# Patient Record
Sex: FEMALE | Race: BLACK OR AFRICAN AMERICAN | Employment: OTHER
[De-identification: names, ages, dates, MRNs, and addresses within clinical notes are randomized per-mention and may not be internally consistent; named-entity substitution may affect disease eponyms.]

---

## 2021-11-04 ENCOUNTER — NURSE TRIAGE (OUTPATIENT)
Dept: OTHER | Facility: CLINIC | Age: 71
End: 2021-11-04

## 2021-11-04 ENCOUNTER — OFFICE VISIT (OUTPATIENT)
Dept: FAMILY MEDICINE CLINIC | Age: 71
End: 2021-11-04
Payer: MEDICARE

## 2021-11-04 VITALS
OXYGEN SATURATION: 98 % | TEMPERATURE: 97.8 F | WEIGHT: 156.2 LBS | HEART RATE: 94 BPM | DIASTOLIC BLOOD PRESSURE: 84 MMHG | SYSTOLIC BLOOD PRESSURE: 140 MMHG | BODY MASS INDEX: 29.49 KG/M2 | HEIGHT: 61 IN

## 2021-11-04 DIAGNOSIS — R41.3 SHORT-TERM MEMORY LOSS: ICD-10-CM

## 2021-11-04 DIAGNOSIS — I10 ESSENTIAL HYPERTENSION: ICD-10-CM

## 2021-11-04 DIAGNOSIS — Z13.1 DIABETES MELLITUS SCREENING: ICD-10-CM

## 2021-11-04 DIAGNOSIS — R35.0 INCREASED URINARY FREQUENCY: ICD-10-CM

## 2021-11-04 DIAGNOSIS — Z76.89 ENCOUNTER TO ESTABLISH CARE WITH NEW DOCTOR: Primary | ICD-10-CM

## 2021-11-04 LAB
BASOPHILS ABSOLUTE: 0.1 K/UL (ref 0–0.2)
BASOPHILS RELATIVE PERCENT: 0.9 %
EOSINOPHILS ABSOLUTE: 0.1 K/UL (ref 0–0.6)
EOSINOPHILS RELATIVE PERCENT: 1 %
HCT VFR BLD CALC: 47.7 % (ref 36–48)
HEMOGLOBIN: 15.6 G/DL (ref 12–16)
LYMPHOCYTES ABSOLUTE: 1.5 K/UL (ref 1–5.1)
LYMPHOCYTES RELATIVE PERCENT: 27 %
MCH RBC QN AUTO: 30.3 PG (ref 26–34)
MCHC RBC AUTO-ENTMCNC: 32.8 G/DL (ref 31–36)
MCV RBC AUTO: 92.4 FL (ref 80–100)
MONOCYTES ABSOLUTE: 0.4 K/UL (ref 0–1.3)
MONOCYTES RELATIVE PERCENT: 7.1 %
NEUTROPHILS ABSOLUTE: 3.6 K/UL (ref 1.7–7.7)
NEUTROPHILS RELATIVE PERCENT: 64 %
PDW BLD-RTO: 14.1 % (ref 12.4–15.4)
PLATELET # BLD: 281 K/UL (ref 135–450)
PMV BLD AUTO: 7.6 FL (ref 5–10.5)
RBC # BLD: 5.16 M/UL (ref 4–5.2)
WBC # BLD: 5.6 K/UL (ref 4–11)

## 2021-11-04 PROCEDURE — 1036F TOBACCO NON-USER: CPT | Performed by: FAMILY MEDICINE

## 2021-11-04 PROCEDURE — G8417 CALC BMI ABV UP PARAM F/U: HCPCS | Performed by: FAMILY MEDICINE

## 2021-11-04 PROCEDURE — G8400 PT W/DXA NO RESULTS DOC: HCPCS | Performed by: FAMILY MEDICINE

## 2021-11-04 PROCEDURE — 1090F PRES/ABSN URINE INCON ASSESS: CPT | Performed by: FAMILY MEDICINE

## 2021-11-04 PROCEDURE — 3017F COLORECTAL CA SCREEN DOC REV: CPT | Performed by: FAMILY MEDICINE

## 2021-11-04 PROCEDURE — 99203 OFFICE O/P NEW LOW 30 MIN: CPT | Performed by: FAMILY MEDICINE

## 2021-11-04 PROCEDURE — G8484 FLU IMMUNIZE NO ADMIN: HCPCS | Performed by: FAMILY MEDICINE

## 2021-11-04 PROCEDURE — 4040F PNEUMOC VAC/ADMIN/RCVD: CPT | Performed by: FAMILY MEDICINE

## 2021-11-04 PROCEDURE — G8427 DOCREV CUR MEDS BY ELIG CLIN: HCPCS | Performed by: FAMILY MEDICINE

## 2021-11-04 PROCEDURE — 1123F ACP DISCUSS/DSCN MKR DOCD: CPT | Performed by: FAMILY MEDICINE

## 2021-11-04 RX ORDER — AMLODIPINE BESYLATE 5 MG/1
5 TABLET ORAL EVERY MORNING
Qty: 30 TABLET | Refills: 1 | Status: SHIPPED | OUTPATIENT
Start: 2021-11-04

## 2021-11-04 RX ORDER — DONEPEZIL HYDROCHLORIDE 5 MG/1
5 TABLET, FILM COATED ORAL NIGHTLY
Qty: 90 TABLET | Refills: 1 | Status: SHIPPED | OUTPATIENT
Start: 2021-11-04

## 2021-11-04 ASSESSMENT — ENCOUNTER SYMPTOMS
ABDOMINAL PAIN: 0
TROUBLE SWALLOWING: 0
WHEEZING: 0
ABDOMINAL DISTENTION: 0
CONSTIPATION: 0
CHEST TIGHTNESS: 0
VOMITING: 0
SHORTNESS OF BREATH: 0
SINUS PRESSURE: 0
COUGH: 0
RHINORRHEA: 0
BACK PAIN: 0
DIARRHEA: 0
NAUSEA: 0
BLOOD IN STOOL: 0

## 2021-11-04 ASSESSMENT — PATIENT HEALTH QUESTIONNAIRE - PHQ9
SUM OF ALL RESPONSES TO PHQ9 QUESTIONS 1 & 2: 0
SUM OF ALL RESPONSES TO PHQ QUESTIONS 1-9: 0
SUM OF ALL RESPONSES TO PHQ QUESTIONS 1-9: 0
2. FEELING DOWN, DEPRESSED OR HOPELESS: 0
SUM OF ALL RESPONSES TO PHQ QUESTIONS 1-9: 0
1. LITTLE INTEREST OR PLEASURE IN DOING THINGS: 0

## 2021-11-04 NOTE — PROGRESS NOTES
Debby Jarvis   65 y.o. female   1950    This is patient's first visit with me. Debby Jarvis is here to establish care as their new PCP. Debby Jarvis has a PMH significant for:    Patient Active Problem List   Diagnosis    Hypertension    Hyperlipidemia    Anxiety       Reason(s) for visit:   Chief Complaint   Patient presents with   Tate Membreno New Doctor    Hypertension     187/112 on Monday. HPI:    Chart review:  -Patient called into nurse triage line today made a same-day appointment. She reported that she has been having issues with maintaining her blood pressure. She reported of a blood pressure reading of 178/112 with heart rate 90. Office visit encounter:    Patient was accompanied by his sister. Patient herself doesn't drive anymore. HTN:  -Hasn't seen a PCP in 6 years.  -No hx of MI, TIA/CVA. -No hx of stress test, LHC.  -Food: [employer] food - pizza. Frozen food.  -Patient has not been checking BP readings regularly  -Patient denied issues with frequent headache, chest pain, shortness of breath, palpitations, malaise, etc.  -Last eye exam: long time ago    Dementia:  -Per sister - 2-3 years of repeating things.  -No known [address] of dementia  -No hx of TIA/CVA  -No hx of head trauma  -Patient kept repeating the same questions within < 5 minutes apart - asking my name, asking if I had written my name down (which I had done as well as gave her my calling card), kept repeatedly asking/telling her sister that their father may have had dementia as well (sister denied this). Increased urinary frequency:  -Stated that she has noted she has been urinating more frequently than usual. She denied issues with dysuria, hematuria, vaginal bleeding/discharge, pelvic pain, fever, etc.  -No prior history of diabetes. As noted above, she has not had routine physical lab work done in a very long time.  -No report of issues of urinary continence.       PDMP monitoring:  -Revealed no controlled medications written of any kind.    -Last report:   Last PDMP [facility] as Reviewed [facility]):  Review User Review Instant Review Result   KRISTY MOORE 2021  1:25 PM Reviewed PDMP [1]       Allergies   Allergen Reactions    Lexapro [Escitalopram Oxalate] Dermatitis       No current outpatient medications on file prior to visit. No current facility-administered medications on file prior to visit. Family History   Problem Relation Age of Onset    Heart Failure Father     Hypertension Sister     Hypertension Brother     Liver Cancer Brother        Social History     Tobacco Use    Smoking status: Former Smoker     Packs/day: 1.00     Years: 15.00     Pack years: 15.00     Types: Cigarettes     Quit date: 2005     Years since quittin.8    Smokeless tobacco: Never Used   Substance Use Topics    Alcohol use: No        No results found for: WBC, HGB, HCT, MCV, PLT    Chemistry        Component Value Date/Time     2012 1035    K 4.1 2012 1035     2012 1035    CO2 27 2012 1035    BUN 16 2012 1035    CREATININE 1.1 2012 1035        Component Value Date/Time    CALCIUM 10.5 2012 1035    ALKPHOS 61 2012 1035    AST 22 2012 1035    ALT 14 2012 1035    BILITOT 0.80 2012 1035          Lab Results   Component Value Date    ALT 14 2012    AST 22 2012    ALKPHOS 61 2012    BILITOT 0.80 2012     No results found for: LABA1C  No results found for: EAG    Review of Systems   Constitutional: Negative for activity change, appetite change, fatigue, fever and unexpected weight change. HENT: Negative for congestion, rhinorrhea, sinus pressure and trouble swallowing. Respiratory: Negative for cough, chest tightness, shortness of breath and wheezing. Cardiovascular: Negative for chest pain, palpitations and leg swelling.    Gastrointestinal: Negative for abdominal distention, abdominal pain, blood in stool, constipation, diarrhea, nausea and vomiting. Genitourinary: Positive for frequency. Negative for dysuria and hematuria. Musculoskeletal: Negative for arthralgias and back pain. Skin: Negative for rash. Neurological: Negative for dizziness, weakness, light-headedness, numbness and headaches. Psychiatric/Behavioral: Negative for sleep disturbance. Wt Readings from Last 3 Encounters:   11/04/21 156 lb 3.2 oz (70.9 kg)   05/08/15 157 lb (71.2 kg)   05/09/14 161 lb (73 kg)       BP Readings from Last 3 Encounters:   11/04/21 (!) 140/84   05/08/15 112/60   05/09/14 118/66       Pulse Readings from Last 3 Encounters:   11/04/21 94   05/08/15 80   05/09/14 76       BP (!) 140/84   Pulse 94   Temp 97.8 °F (36.6 °C)   Ht 5' 1\" (1.549 m)   Wt 156 lb 3.2 oz (70.9 kg)   SpO2 98%   BMI 29.51 kg/m²      Physical Exam  Vitals reviewed. Constitutional:       General: She is awake. She is not in acute distress. Appearance: She is overweight. She is not ill-appearing or diaphoretic. HENT:      Head: Normocephalic and atraumatic. No abrasion or masses. Hair is normal.      Right Ear: External ear normal.      Left Ear: External ear normal.      Nose: Nose normal.   Eyes:      General: Lids are normal. Gaze aligned appropriately. No scleral icterus. Right eye: No discharge. Left eye: No discharge. Extraocular Movements: Extraocular movements intact. Conjunctiva/sclera: Conjunctivae normal.   Neck:      Trachea: Phonation normal.   Cardiovascular:      Rate and Rhythm: Normal rate and regular rhythm. Pulmonary:      Effort: Pulmonary effort is normal. No respiratory distress. Breath sounds: No wheezing, rhonchi or rales. Abdominal:      General: Abdomen is flat. There is no distension. Palpations: Abdomen is soft. Musculoskeletal:         General: No deformity. Normal range of motion. Cervical back: Normal range of motion. No erythema.       Right lower leg: No edema. Left lower leg: No edema. Skin:     Coloration: Skin is not cyanotic, jaundiced or pale. Findings: No abrasion, abscess, bruising, ecchymosis, erythema, signs of injury, laceration, lesion, petechiae, rash or wound. Neurological:      General: No focal deficit present. Mental Status: She is alert. Mental status is at baseline. GCS: GCS eye subscore is 4. GCS verbal subscore is 5. GCS motor subscore is 6. Cranial Nerves: No cranial nerve deficit, dysarthria or facial asymmetry. Motor: No weakness, tremor, atrophy or seizure activity. Coordination: Coordination normal.      Gait: Gait is intact. Psychiatric:         Attention and Perception: Attention and perception normal.         Mood and Affect: Mood and affect normal.         Speech: Speech normal.         Behavior: Behavior normal. Behavior is cooperative. Thought Content: Thought content normal.       Assessment/Plan: Dheeraj Hagen was seen today for hypertension and psoriasis. Diagnoses and all orders for this visit:    Encounter to establish care with new doctor    Essential hypertension  Comments:  BP goal <130/80. Advised patient to take BP medication(s) as directed daily. It is recommended to monitor BP & HR on a weekly basis, maintain a low salt diet, monitor caffeine intake, and exercise as much as possible with recommended minimum goal of 150 min/week. Long history of uncontrolled HTN can lead to CAD/MI, TIA/stroke, aneurysm, retinopathy, etc.  Orders:  -     CBC Auto Differential; Future  -     Microalbumin / Creatinine Urine Ratio; Future  -     TSH without Reflex; Future  -     T4, Free; Future  -     Renal Function Panel; Future  -     Hepatic Function Panel; Future  -     Urinalysis; Future  -     amLODIPine (NORVASC) 5 MG tablet; Take 1 tablet by mouth every morning    Short-term memory loss  Comments:  Patient likely has Alzheimer's dementia and possibly mix vascular component.  Discussed the progressive nature of dementia and that there's no cure. We discussed that Donepezil will only help slow down the progression of dementia and will not significantly cause a major change in her memory. Orders:  -     donepezil (ARICEPT) 5 MG tablet; Take 1 tablet by mouth nightly    Increased urinary frequency  -     Microalbumin / Creatinine Urine Ratio; Future  -     Urinalysis; Future    Diabetes mellitus screening  -     Hemoglobin A1C; Future       I reviewed the plan of care with the patient. Patient acknowledged understanding and agreed with plan of care overall. Medications Discontinued During This Encounter   Medication Reason    triamterene-hydrochlorothiazide (MAXZIDE-25) 37.5-25 MG per tablet LIST CLEANUP    lisinopril (PRINIVIL;ZESTRIL) 10 MG tablet LIST CLEANUP    Cholecalciferol (VITAMIN D3) 1000 UNIT CAPS LIST CLEANUP    Ascorbic Acid (VITAMIN C) 250 MG tablet LIST CLEANUP        General information on medications:  -When it comes to medications, whether with starting or adding a new medication or increasing the dose of a current medication, the benefits and risks have to always be considered and weighed over, especially if one is taking other medications as well. -There are no medications that have no side effects and that there is always a risk involved with taking a medication.    -If a side effect were to occur with starting a new medication or with increasing the dose of a current medication that either the medication can be totally discontinued altogether or simply decrease the dose of it and if this would be the case a follow-up appointment would be deemed necessary.    -The drug allergy list will then be updated with the corresponding side effect(s) if it's deemed to be a true 'drug allergy'.     -The most common adverse effects of medication(s) were addressed at today's visit.    -Lastly, the coverage status of a medication may vary from insurance to insurance and the only way to verify if the medication is covered is to send an actual prescription in.    -The drug formulary of each insurance changes without any warning or notification to the healthcare provider let alone the pharmacy.  -The cost of medications vary from insurance to insurance and the cost is always subject to change just like the drug formulary. Follow-up: Return in about 4 weeks (around 12/2/2021) for HTN. .     Patient was informed that if his or her symptoms worsen to follow up with me sooner or go to the nearest ER if the symptoms are very significant and warrant higher level of care. Regarding my note:  -This note was composed (by me only and not with assistance via a scribe) to the best of my knowledge and recollection of the encounter with the patient using one of my own customized note templates utilizing a combination of typing and dictating with the [address] [place] [address] speech recognition software. As a result, the note may possibly have various errors (e.g. spelling, grammar, and non-sensible words/phrases/statements) despite reviewing the note prior to signing it for completion. Shawn Hector M.D.   [address] [place] [address]    Electronically signed by Pk Willis on 11/4/2021 at 5:11 PM.

## 2021-11-04 NOTE — PATIENT INSTRUCTIONS
Patient Education        DASH Diet: Care Instructions  Your Care Instructions     The DASH diet is an eating plan that can help lower your blood pressure. DASH stands for Dietary Approaches to Stop Hypertension. Hypertension is high blood pressure. The DASH diet focuses on eating foods that are high in calcium, potassium, and magnesium. These nutrients can lower blood pressure. The foods that are highest in these nutrients are fruits, vegetables, low-fat dairy products, nuts, seeds, and legumes. But taking calcium, potassium, and magnesium supplements instead of eating foods that are high in those nutrients does not have the same effect. The DASH diet also includes whole grains, fish, and poultry. The DASH diet is one of several lifestyle changes your doctor may recommend to lower your high blood pressure. Your doctor may also want you to decrease the amount of sodium in your diet. Lowering sodium while following the DASH diet can lower blood pressure even further than just the DASH diet alone. Follow-up care is a key part of your treatment and safety. Be sure to make and go to all appointments, and call your doctor if you are having problems. It's also a good idea to know your test results and keep a list of the medicines you take. How can you care for yourself at home? Following the DASH diet  · Eat 4 to 5 servings of fruit each day. A serving is 1 medium-sized piece of fruit, ½ cup chopped or canned fruit, 1/4 cup dried fruit, or 4 ounces (½ cup) of fruit juice. Choose fruit more often than fruit juice. · Eat 4 to 5 servings of vegetables each day. A serving is 1 cup of lettuce or raw leafy vegetables, ½ cup of chopped or cooked vegetables, or 4 ounces (½ cup) of vegetable juice. Choose vegetables more often than vegetable juice. · Get 2 to 3 servings of low-fat and fat-free dairy each day. A serving is 8 ounces of milk, 1 cup of yogurt, or 1 ½ ounces of cheese. · Eat 6 to 8 servings of grains each day.  A serving is 1 slice of bread, 1 ounce of dry cereal, or ½ cup of cooked rice, pasta, or cooked cereal. Try to choose whole-grain products as much as possible. · Limit lean meat, poultry, and fish to 2 servings each day. A serving is 3 ounces, about the size of a deck of cards. · Eat 4 to 5 servings of nuts, seeds, and legumes (cooked dried beans, lentils, and split peas) each week. A serving is 1/3 cup of nuts, 2 tablespoons of seeds, or ½ cup of cooked beans or peas. · Limit fats and oils to 2 to 3 servings each day. A serving is 1 teaspoon of vegetable oil or 2 tablespoons of salad dressing. · Limit sweets and added sugars to 5 servings or less a week. A serving is 1 tablespoon jelly or jam, ½ cup sorbet, or 1 cup of lemonade. · Eat less than 2,300 milligrams (mg) of sodium a day. If you limit your sodium to 1,500 mg a day, you can lower your blood pressure even more. · Be aware that all of these are the suggested number of servings for people who eat 1,800 to 2,000 calories a day. Your recommended number of servings may be different if you need more or fewer calories. Tips for success  · Start small. Do not try to make dramatic changes to your diet all at once. You might feel that you are missing out on your favorite foods and then be more likely to not follow the plan. Make small changes, and stick with them. Once those changes become habit, add a few more changes. · Try some of the following:  ? Make it a goal to eat a fruit or vegetable at every meal and at snacks. This will make it easy to get the recommended amount of fruits and vegetables each day. ? Try yogurt topped with fruit and nuts for a snack or healthy dessert. ? Add lettuce, tomato, cucumber, and onion to sandwiches. ? Combine a ready-made pizza crust with low-fat mozzarella cheese and lots of vegetable toppings. Try using tomatoes, squash, spinach, broccoli, carrots, cauliflower, and onions. ?  Have a variety of cut-up vegetables with a low-fat dip as an appetizer instead of chips and dip. ? Sprinkle sunflower seeds or chopped almonds over salads. Or try adding chopped walnuts or almonds to cooked vegetables. ? Try some vegetarian meals using beans and peas. Add garbanzo or kidney beans to salads. Make burritos and tacos with mashed tan beans or black beans. Where can you learn more? Go to https://IOCS.ShowUhow. org and sign in to your [employer] account. Enter K021 in the OneBuckResume box to learn more about \"DASH Diet: Care Instructions. \"     If you do not have an account, please click on the \"Sign Up Now\" link. Current as of: April 29, 2021               Content Version: 13.0  © 5442-2940 [employer], [employer]. Care instructions adapted under license by [facility] ([facility]). If you have questions about a medical condition or this instruction, always ask your healthcare professional. [address] any warranty or liability for your use of this information.

## 2021-11-04 NOTE — TELEPHONE ENCOUNTER
Reason for Disposition   [1] Caller is not with the adult (patient) AND [2] reporting urgent symptoms   Systolic BP >= 352 OR Diastolic >= 172    Answer Assessment - Initial Assessment Questions  1. REASON FOR CALL or QUESTION: \"What is your reason for calling today? \" or \"How can I best help you? \" or \"What question do you have that I can help answer? \"      She is having high blood pressue  per NP in home. 178/112,  HR 90. Had an appointment with Eduin Carrillo and appointment. Answer Assessment - Initial Assessment Questions  1. BLOOD PRESSURE: \"What is the blood pressure? \" \"Did you take at least two measurements 5 minutes apart?\"      178/112 on Monday,   By insurance NP doing yearly physical.    2. ONSET: \"When did you take your blood pressure? \"      Monday     3. HOW: \"How did you obtain the blood pressure? \" (e.g., visiting nurse, automatic home BP monitor)      Visiting nurse    4. HISTORY: \"Do you have a history of high blood pressure? \"      Yes    5. MEDICATIONS: Pradeep Bro you taking any medications for blood pressure? \" \"Have you missed any doses recently? \"      Not currently on any    6. OTHER SYMPTOMS: \"Do you have any symptoms? \" (e.g., headache, chest pain, blurred vision, difficulty breathing, weakness)      Denies chest pain, headache, diffficulty breathing, weakness, blurred or double vision. 7. PREGNANCY: \"Is there any chance you are pregnant? \" \"When was your last menstrual period? \"      No    Protocols used: INFORMATION ONLY CALL - NO TRIAGE-ADULT-AH, HIGH BLOOD PRESSURE-ADULT-[place]    Received call from Carl Cole at [facility] with Red Flag Complaint. Brief description of triage: no contact    Triage indicates for patient to go to ED    Care advice provided, patient verbalizes understanding; denies any other questions or concerns; instructed to call back for any new or worsening symptoms. Attention Provider: Thank you for allowing me to participate in the care of your patient.   The patient was connected to triage in response to information provided to the E[facility]. Please do not respond through this encounter as the response is not directed to a shared pool. Patient called E[facility] [place] with red flag complaint to establish care with Dr Srinivas Dillard. Brief description of triage: elevated blood pressure    Triage indicates for patient to be seen today  [facility] or ED if no appointments today    Care advice provided, patient verbalizes understanding; denies any other questions or concerns; instructed to call back for any new or worsening symptoms. Writer provided warm transfer to [facility] at [facility] for appointment scheduling. Attention Provider: Thank you for allowing me to participate in the care of your patient. The patient was connected to triage in response to information provided to the ECC. Please do not respond through this encounter as the response is not directed to a shared pool.

## 2021-11-05 LAB
ALBUMIN SERPL-MCNC: 4.4 G/DL (ref 3.4–5)
ALP BLD-CCNC: 87 U/L (ref 40–129)
ALT SERPL-CCNC: 8 U/L (ref 10–40)
ANION GAP SERPL CALCULATED.3IONS-SCNC: 14 MMOL/L (ref 3–16)
AST SERPL-CCNC: 19 U/L (ref 15–37)
BILIRUB SERPL-MCNC: 0.8 MG/DL (ref 0–1)
BILIRUBIN DIRECT: <0.2 MG/DL (ref 0–0.3)
BILIRUBIN, INDIRECT: ABNORMAL MG/DL (ref 0–1)
BUN BLDV-MCNC: 17 MG/DL (ref 7–20)
CALCIUM SERPL-MCNC: 9.4 MG/DL (ref 8.3–10.6)
CHLORIDE BLD-SCNC: 98 MMOL/L (ref 99–110)
CO2: 27 MMOL/L (ref 21–32)
CREAT SERPL-MCNC: 0.8 MG/DL (ref 0.6–1.2)
CREATININE URINE: 78.5 MG/DL (ref 28–259)
GFR AFRICAN AMERICAN: >60
GFR NON-AFRICAN AMERICAN: >60
GLUCOSE BLD-MCNC: 62 MG/DL (ref 70–99)
MICROALBUMIN UR-MCNC: 43.7 MG/DL
MICROALBUMIN/CREAT UR-RTO: 556.7 MG/G (ref 0–30)
PHOSPHORUS: 3.5 MG/DL (ref 2.5–4.9)
POTASSIUM SERPL-SCNC: 5.1 MMOL/L (ref 3.5–5.1)
SODIUM BLD-SCNC: 139 MMOL/L (ref 136–145)
T4 FREE: 1.6 NG/DL (ref 0.9–1.8)
TOTAL PROTEIN: 7.4 G/DL (ref 6.4–8.2)
TSH SERPL DL<=0.05 MIU/L-ACNC: 0.83 UIU/ML (ref 0.27–4.2)

## 2021-11-09 NOTE — PROGRESS NOTES
Carroll Urban   65 y.o. female   1950    HPI:    Patient was last seen by me on 11/4/2021. During our last office visit:   -This was her first visit to establish care as her new PCP. Reason(s) for visit:   Chief Complaint   Patient presents with   [address] Other     Would like a letter dismissing her from [email or web address] duty.  Other     Pt's sisters came on her behalf. They have a POA. Patient did not accompany her sister (PoA). She refused to come and leave her apartment according to her sister. The patient's other sister came as well, who I have not met before. The patient's power of  provided forms that prove that she indeed is the power of . This was then subsequently scanned into the patient's electronic medical record. We discussed the patient's lab work today. I ordered routine comprehensive lab work. CBC, RFT, LFT, TSH, free T4 were normal.  Glucose was slightly low at 62. Urine test showed signs of macroalbuminuria. We also discussed the request of a letter to excuse the power of  from being summoned to jury duty for Nov 22nd. Patient's power of  (sister) is her sole caretaker and handles everything for the patient - driving, acquiring basic necessities (e.g. food, toiletries), manages her finances, etc.  The PoA did not disclose any other reasons that I should be notified of (including health reasons) that would prevent her from being summoned to jury duty. Allergies   Allergen Reactions    Lexapro [Escitalopram Oxalate] Dermatitis       Current Outpatient Medications on File Prior to Visit   Medication Sig Dispense Refill    amLODIPine (NORVASC) 5 MG tablet Take 1 tablet by mouth every morning 30 tablet 1    donepezil (ARICEPT) 5 MG tablet Take 1 tablet by mouth nightly 90 tablet 1     No current facility-administered medications on file prior to visit.         Family History   Problem Relation Age of Onset    Heart Failure Father  Hypertension Sister     Hypertension Brother     Liver Cancer Brother        Social History     Tobacco Use    Smoking status: Former Smoker     Packs/day: 1.00     Years: 15.00     Pack years: 15.00     Types: Cigarettes     Quit date: 2005     Years since quittin.8    Smokeless tobacco: Never Used   Substance Use Topics    Alcohol use: No        Lab Results   Component Value Date    WBC 5.6 2021    HGB 15.6 2021    HCT 47.7 2021    MCV 92.4 2021     2021       Chemistry        Component Value Date/Time     2021 1614    K 5.1 2021 1614    CL 98 (L) 2021 1614    CO2 27 2021 1614    BUN 17 2021 1614    CREATININE 0.8 2021 1614        Component Value Date/Time    CALCIUM 9.4 2021 1614    ALKPHOS 87 2021 1614    AST 19 2021 1614    ALT 8 (L) 2021 1614    BILITOT 0.8 2021 1614          Lab Results   Component Value Date    ALT 8 (L) 2021    AST 19 2021    ALKPHOS 87 2021    BILITOT 0.8 2021     No results found for: LABA1C  No results found for: EAG    Review of Systems   Constitutional: Negative for activity change, appetite change, fatigue, fever and unexpected weight change. HENT: Negative for congestion, rhinorrhea, sinus pressure and trouble swallowing. Respiratory: Negative for cough, chest tightness, shortness of breath and wheezing. Cardiovascular: Negative for chest pain, palpitations and leg swelling. Gastrointestinal: Negative for abdominal distention, abdominal pain, blood in stool, constipation, diarrhea, nausea and vomiting. Genitourinary: Negative for dysuria, frequency and hematuria. Musculoskeletal: Negative for arthralgias and back pain. Skin: Negative for rash. Neurological: Negative for dizziness, weakness, light-headedness, numbness and headaches.        Wt Readings from Last 3 Encounters:   21 156 lb 3.2 oz (70.9 kg)   05/08/15 157 lb (71.2 kg)   05/09/14 161 lb (73 kg)       BP Readings from Last 3 Encounters:   11/04/21 (!) 140/84   05/08/15 112/60   05/09/14 118/66       Pulse Readings from Last 3 Encounters:   11/04/21 94   05/08/15 80   05/09/14 76       There were no vitals taken for this visit. Physical Exam  Vitals reviewed. Constitutional:       General: She is awake. She is not in acute distress. Appearance: She is overweight. She is not ill-appearing or diaphoretic. HENT:      Head: Normocephalic and atraumatic. No abrasion or masses. Hair is normal.      Right Ear: External ear normal.      Left Ear: External ear normal.      Nose: Nose normal.   Eyes:      General: Lids are normal. Gaze aligned appropriately. No scleral icterus. Right eye: No discharge. Left eye: No discharge. Extraocular Movements: Extraocular movements intact. Conjunctiva/sclera: Conjunctivae normal.   Neck:      Trachea: Phonation normal.   Cardiovascular:      Rate and Rhythm: Normal rate and regular rhythm. Pulmonary:      Effort: Pulmonary effort is normal. No respiratory distress. Breath sounds: No wheezing, rhonchi or rales. Abdominal:      General: Abdomen is flat. There is no distension. Palpations: Abdomen is soft. Musculoskeletal:         General: No deformity. Normal range of motion. Cervical back: Normal range of motion. No erythema. Right lower leg: No edema. Left lower leg: No edema. Skin:     Coloration: Skin is not cyanotic, jaundiced or pale. Findings: No abrasion, abscess, bruising, ecchymosis, erythema, signs of injury, laceration, lesion, petechiae, rash or wound. Neurological:      General: No focal deficit present. Mental Status: She is alert. Mental status is at baseline. GCS: GCS eye subscore is 4. GCS verbal subscore is 5. GCS motor subscore is 6. Cranial Nerves: No cranial nerve deficit, dysarthria or facial asymmetry.       Motor: No weakness, tremor, atrophy or seizure activity. Coordination: Coordination normal.      Gait: Gait is intact. Psychiatric:         Attention and Perception: Attention and perception normal.         Mood and Affect: Mood and affect normal.         Speech: Speech normal.         Behavior: Behavior normal. Behavior is cooperative. Thought Content: Thought content normal.       Assessment/Plan: Lori Sidhu was seen today for discuss labs, other and other. Diagnoses and all orders for this visit:    Encounter for completion of form with patient - jury duty  Comments:  Letter provided to power of . Essential hypertension  -     losartan (COZAAR) 25 MG tablet; Take 1 tablet by mouth nightly    Hypertensive kidney disease  -     losartan (COZAAR) 25 MG tablet; Take 1 tablet by mouth nightly    Positive for macroalbuminuria  Comments:  Discussed that ACE/ARB are first line treatment for HTN and proteinuria. Orders:  -     losartan (COZAAR) 25 MG tablet; Take 1 tablet by mouth nightly    Current non-adherence to medical treatment  Comments:  Emphasized need to be compliant with meds. I reviewed the plan of care with the patient. Patient acknowledged understanding and agreed with plan of care overall. There are no discontinued medications. General information on medications:  -When it comes to medications, whether with starting or adding a new medication or increasing the dose of a current medication, the benefits and risks have to always be considered and weighed over, especially if one is taking other medications as well.    -There are no medications that have no side effects and that there is always a risk involved with taking a medication.    -If a side effect were to occur with starting a new medication or with increasing the dose of a current medication that either the medication can be totally discontinued altogether or simply decrease the dose of it and if this would be the case a

## 2021-11-10 ENCOUNTER — OFFICE VISIT (OUTPATIENT)
Dept: FAMILY MEDICINE CLINIC | Age: 71
End: 2021-11-10
Payer: MEDICARE

## 2021-11-10 DIAGNOSIS — I12.9 HYPERTENSIVE KIDNEY DISEASE: ICD-10-CM

## 2021-11-10 DIAGNOSIS — Z02.89 ENCOUNTER FOR COMPLETION OF FORM WITH PATIENT: Primary | ICD-10-CM

## 2021-11-10 DIAGNOSIS — Z91.199 CURRENT NON-ADHERENCE TO MEDICAL TREATMENT: ICD-10-CM

## 2021-11-10 DIAGNOSIS — R80.9 POSITIVE FOR MACROALBUMINURIA: ICD-10-CM

## 2021-11-10 DIAGNOSIS — I10 ESSENTIAL HYPERTENSION: ICD-10-CM

## 2021-11-10 PROCEDURE — G8400 PT W/DXA NO RESULTS DOC: HCPCS | Performed by: FAMILY MEDICINE

## 2021-11-10 PROCEDURE — 4040F PNEUMOC VAC/ADMIN/RCVD: CPT | Performed by: FAMILY MEDICINE

## 2021-11-10 PROCEDURE — G8427 DOCREV CUR MEDS BY ELIG CLIN: HCPCS | Performed by: FAMILY MEDICINE

## 2021-11-10 PROCEDURE — 1036F TOBACCO NON-USER: CPT | Performed by: FAMILY MEDICINE

## 2021-11-10 PROCEDURE — 1090F PRES/ABSN URINE INCON ASSESS: CPT | Performed by: FAMILY MEDICINE

## 2021-11-10 PROCEDURE — 99213 OFFICE O/P EST LOW 20 MIN: CPT | Performed by: FAMILY MEDICINE

## 2021-11-10 PROCEDURE — 3017F COLORECTAL CA SCREEN DOC REV: CPT | Performed by: FAMILY MEDICINE

## 2021-11-10 PROCEDURE — 1123F ACP DISCUSS/DSCN MKR DOCD: CPT | Performed by: FAMILY MEDICINE

## 2021-11-10 PROCEDURE — G8484 FLU IMMUNIZE NO ADMIN: HCPCS | Performed by: FAMILY MEDICINE

## 2021-11-10 PROCEDURE — G8417 CALC BMI ABV UP PARAM F/U: HCPCS | Performed by: FAMILY MEDICINE

## 2021-11-10 RX ORDER — LOSARTAN POTASSIUM 25 MG/1
25 TABLET ORAL NIGHTLY
Qty: 90 TABLET | Refills: 0 | Status: SHIPPED | OUTPATIENT
Start: 2021-11-10

## 2021-11-10 ASSESSMENT — ENCOUNTER SYMPTOMS
SINUS PRESSURE: 0
WHEEZING: 0
ABDOMINAL DISTENTION: 0
CONSTIPATION: 0
TROUBLE SWALLOWING: 0
NAUSEA: 0
DIARRHEA: 0
BLOOD IN STOOL: 0
ABDOMINAL PAIN: 0
CHEST TIGHTNESS: 0
BACK PAIN: 0
SHORTNESS OF BREATH: 0
COUGH: 0
RHINORRHEA: 0
VOMITING: 0

## 2022-01-08 NOTE — PROGRESS NOTES
Eusebio Pritchett   66 y.o. female   1950    HPI:    Patient was last seen by me on 11/10/2021. During our last office visit:   -Letter excusing patient for jury duty was composed at the request of patient.  -Losartan 25 mg was prescribed for hypertension and recent urinalysis showing positive for macroalbuminuria (microalb:Cr  - 556.7). Reason(s) for visit:   Chief Complaint   Patient presents with    Other     Pt's sister came on pt's behalf. She is POA. She reported that things are stable but pt is not taking her medication. Patient was accompanied today by her sister, who is also her power of . PoA stated that she has good appetite and as a result, her cognition has improved. She's well hydrated. PoA and sisters provide her food. PoA and sister reported that she has not been taking her meds. She has no issues with her ADLs, but does not bathe every day. She does not like clutter and keep herself clean. She only uses a microwave for \"cooking. \"  She hasn't wandered away, but did some time early last year. She lives in a 2nd floor apartment. PoA noted that a [employer]  took her and went from various ATMs to get her money. She was able to remember her GINGER ID. PoA fortunately was notified by the bank. Power of  noted that after this incident that she contacted the bank and had a transfer to a secure savings account. Both sisters stated that they are retired and see her at least twice a week.     Dementia:  -Per sister - 2-3 years of repeating things.  -No known [address] of dementia  -No hx of TIA/CVA  -No hx of head trauma      Allergies   Allergen Reactions    Lexapro [Escitalopram Oxalate] Dermatitis       Current Outpatient Medications on File Prior to Visit   Medication Sig Dispense Refill    losartan (COZAAR) 25 MG tablet Take 1 tablet by mouth nightly (Patient not taking: Reported on 1/10/2022) 90 tablet 0    amLODIPine (NORVASC) 5 MG tablet Take 1 tablet by mouth every morning (Patient not taking: Reported on 1/10/2022) 30 tablet 1    donepezil (ARICEPT) 5 MG tablet Take 1 tablet by mouth nightly (Patient not taking: Reported on 1/10/2022) 90 tablet 1     No current facility-administered medications on file prior to visit. Family History   Problem Relation Age of Onset    Heart Failure Father     Hypertension Sister     Hypertension Brother     Liver Cancer Brother        Social History     Tobacco Use    Smoking status: Former Smoker     Packs/day: 1.00     Years: 15.00     Pack years: 15.00     Types: Cigarettes     Quit date: 2005     Years since quittin.0    Smokeless tobacco: Never Used   Substance Use Topics    Alcohol use: No        Lab Results   Component Value Date    WBC 5.6 2021    HGB 15.6 2021    HCT 47.7 2021    MCV 92.4 2021     2021       Chemistry        Component Value Date/Time     2021 1614    K 5.1 2021 1614    CL 98 (L) 2021 1614    CO2 27 2021 1614    BUN 17 2021 1614    CREATININE 0.8 2021 1614        Component Value Date/Time    CALCIUM 9.4 2021 1614    ALKPHOS 87 2021 1614    AST 19 2021 1614    ALT 8 (L) 2021 1614    BILITOT 0.8 2021 1614          Lab Results   Component Value Date    ALT 8 (L) 2021    AST 19 2021    ALKPHOS 87 2021    BILITOT 0.8 2021     No results found for: LABA1C  No results found for: EAG    Review of Systems   Unable to perform ROS: Other      Wt Readings from Last 3 Encounters:   21 156 lb 3.2 oz (70.9 kg)   05/08/15 157 lb (71.2 kg)   14 161 lb (73 kg)       BP Readings from Last 3 Encounters:   21 (!) 140/84   05/08/15 112/60   14 118/66       Pulse Readings from Last 3 Encounters:   21 94   05/08/15 80   14 76       There were no vitals taken for this visit. Physical Exam  Vitals reviewed.    Constitutional:       General: She is awake. She is not in acute distress. Appearance: She is overweight. She is not ill-appearing or diaphoretic. HENT:      Head: Normocephalic and atraumatic. No abrasion or masses. Hair is normal.      Right Ear: External ear normal.      Left Ear: External ear normal.      Nose: Nose normal.   Eyes:      General: Lids are normal. Gaze aligned appropriately. No scleral icterus. Right eye: No discharge. Left eye: No discharge. Extraocular Movements: Extraocular movements intact. Conjunctiva/sclera: Conjunctivae normal.   Neck:      Trachea: Phonation normal.   Cardiovascular:      Rate and Rhythm: Normal rate and regular rhythm. Pulmonary:      Effort: Pulmonary effort is normal. No respiratory distress. Breath sounds: No wheezing, rhonchi or rales. Abdominal:      General: Abdomen is flat. There is no distension. Palpations: Abdomen is soft. Musculoskeletal:         General: No deformity. Normal range of motion. Cervical back: Normal range of motion. No erythema. Right lower leg: No edema. Left lower leg: No edema. Skin:     Coloration: Skin is not cyanotic, jaundiced or pale. Findings: No abrasion, abscess, bruising, ecchymosis, erythema, signs of injury, laceration, lesion, petechiae, rash or wound. Neurological:      General: No focal deficit present. Mental Status: She is alert. Mental status is at baseline. GCS: GCS eye subscore is 4. GCS verbal subscore is 5. GCS motor subscore is 6. Cranial Nerves: No cranial nerve deficit, dysarthria or facial asymmetry. Motor: No weakness, tremor, atrophy or seizure activity. Coordination: Coordination normal.      Gait: Gait is intact. Psychiatric:         Attention and Perception: Attention and perception normal.         Mood and Affect: Mood and affect normal.         Speech: Speech normal.         Behavior: Behavior normal. Behavior is cooperative.          Thought Content: Thought content normal.       Assessment/Plan: Meagan Grant was seen today for other. Diagnoses and all orders for this visit:    Alzheimer's dementia without behavioral disturbance, unspecified timing of dementia onset [facility])  Comments:  Patient has good support network and there are no signs of neglect. Sisters monitor her safety, provide her with food, and manage her finances. I discussed with patient's two sisters (one of them being PoA) that telemedicine would be a viable option for the patient that can be conducted at her home preferably with a video conference call. I advised her sisters to speak to the patient about this option. I also did not note that a face-to-face encounter would be preferable, especially given that she has a history of hypertension. Patient's sisters would speak to patient about telemedicine. Essential hypertension  Comments:  Advised to restart Losartan for HTN and due to CKD issue. We discussed the long-term uncontrolled hypertension can lead to chronic kidney disease and possibly end-stage renal failure with treatment of dialysis being necessary. Positive for macroalbuminuria    Medical non-compliance  Comments:  Discussed about willing to follow up with patient for as long as telemedicine visit. We discussed one way about having her take her medications as simply crushing it up and mixing it with her food. Educated about 2019 novel coronavirus infection         Comments:                Advised to get COVID-19 vaccine. I reviewed the plan of care with the patient. Patient acknowledged understanding and agreed with plan of care overall. There are no discontinued medications. General information on medications:  -When it comes to medications, whether with starting or adding a new medication or increasing the dose of a current medication, the benefits and risks have to always be considered and weighed over, especially if one is taking other medications as well.

## 2022-01-10 ENCOUNTER — OFFICE VISIT (OUTPATIENT)
Dept: FAMILY MEDICINE CLINIC | Age: 72
End: 2022-01-10
Payer: MEDICARE

## 2022-01-10 DIAGNOSIS — Z71.89 EDUCATED ABOUT 2019 NOVEL CORONAVIRUS INFECTION: ICD-10-CM

## 2022-01-10 DIAGNOSIS — Z91.199 MEDICAL NON-COMPLIANCE: ICD-10-CM

## 2022-01-10 DIAGNOSIS — I10 ESSENTIAL HYPERTENSION: ICD-10-CM

## 2022-01-10 DIAGNOSIS — R80.9 POSITIVE FOR MACROALBUMINURIA: ICD-10-CM

## 2022-01-10 DIAGNOSIS — F02.80 ALZHEIMER'S DEMENTIA WITHOUT BEHAVIORAL DISTURBANCE, UNSPECIFIED TIMING OF DEMENTIA ONSET: Primary | ICD-10-CM

## 2022-01-10 DIAGNOSIS — G30.9 ALZHEIMER'S DEMENTIA WITHOUT BEHAVIORAL DISTURBANCE, UNSPECIFIED TIMING OF DEMENTIA ONSET: Primary | ICD-10-CM

## 2022-01-10 PROCEDURE — 1090F PRES/ABSN URINE INCON ASSESS: CPT | Performed by: FAMILY MEDICINE

## 2022-01-10 PROCEDURE — G8417 CALC BMI ABV UP PARAM F/U: HCPCS | Performed by: FAMILY MEDICINE

## 2022-01-10 PROCEDURE — 3017F COLORECTAL CA SCREEN DOC REV: CPT | Performed by: FAMILY MEDICINE

## 2022-01-10 PROCEDURE — G8484 FLU IMMUNIZE NO ADMIN: HCPCS | Performed by: FAMILY MEDICINE

## 2022-01-10 PROCEDURE — 1123F ACP DISCUSS/DSCN MKR DOCD: CPT | Performed by: FAMILY MEDICINE

## 2022-01-10 PROCEDURE — G8400 PT W/DXA NO RESULTS DOC: HCPCS | Performed by: FAMILY MEDICINE

## 2022-01-10 PROCEDURE — 1036F TOBACCO NON-USER: CPT | Performed by: FAMILY MEDICINE

## 2022-01-10 PROCEDURE — 4040F PNEUMOC VAC/ADMIN/RCVD: CPT | Performed by: FAMILY MEDICINE

## 2022-01-10 PROCEDURE — 99214 OFFICE O/P EST MOD 30 MIN: CPT | Performed by: FAMILY MEDICINE

## 2022-01-10 PROCEDURE — G8427 DOCREV CUR MEDS BY ELIG CLIN: HCPCS | Performed by: FAMILY MEDICINE

## 2022-04-11 NOTE — PROGRESS NOTES
Guanakito Yadav   66 y.o. female   1950    Virtual visit encounter type:   [] Doxy. me  [x] Telephone w/o video  [] Monohart  [] Other: This patient was last seen by me on 1/10/2022. HPI:  Chief Complaint   Patient presents with    3 Month Follow-Up    Hypertension    Dementia     Patient originally had an in person office visit scheduled for 4/11/2022. This had to be rescheduled because the actual patient did not show up while her sister did. Unfortunately, a video call cannot be arranged in the whole visit had to be done over the telephone. The patient herself as well as her 2 sisters were present during today's telephone encounter. I was able to speak the patient and she said that she had no particular health issues or concerns that she wanted discussed with me. I inquired about her living situation. She stated that she is able to perform basic ADLs such as putting her clothes on, feeding herself, bathing herself, etc.  She denied issues of urinary or fecal incontinence. Her sisters told me that she overall keeps her place nice and organized. There are no particular concerns from either the patient or the patient's sisters. Both sisters stated that her overall neurological and cognitive status have remained overall stable. Patient has a history of hypertension. She was prescribed both losartan and amlodipine, but she still continues to not take her medication. She does not check her blood pressure herself and has no one that she can rely on to check her blood pressure reliably. She also did not report trying to go to places such as the grocery store to try to check her blood pressure. Patient has history of dementia. She was prescribed donepezil last year by me, but has not been taking this medication as well. Of note, the patient's sister reported that the patient's diet has overall been better and she is eating healthier. Appetite is not an issue for her.     We discussed that as her PCP moving forward that she would need to participate in her office visits. We discussed about contacting [facility] to help with assisting with arranging home health    Dementia:  -Per sister - 2-3 years of repeating things.  -No known [facility] of dementia  -No hx of TIA/CVA  -No hx of head trauma      Allergies   Allergen Reactions    Lexapro [Escitalopram Oxalate] Dermatitis       Current Outpatient Medications on File Prior to Visit   Medication Sig Dispense Refill    losartan (COZAAR) 25 MG tablet Take 1 tablet by mouth nightly (Patient not taking: Reported on 1/10/2022) 90 tablet 0    amLODIPine (NORVASC) 5 MG tablet Take 1 tablet by mouth every morning (Patient not taking: Reported on 1/10/2022) 30 tablet 1    donepezil (ARICEPT) 5 MG tablet Take 1 tablet by mouth nightly (Patient not taking: Reported on 1/10/2022) 90 tablet 1     No current facility-administered medications on file prior to visit. Family History   Problem Relation Age of Onset    Heart Failure Father     Hypertension Sister     Hypertension Brother     Liver Cancer Brother        Social History     Tobacco Use    Smoking status: Former Smoker     Packs/day: 1.00     Years: 15.00     Pack years: 15.00     Types: Cigarettes     Quit date: 2005     Years since quittin.2    Smokeless tobacco: Never Used   Substance Use Topics    Alcohol use: No      Review of Systems   Constitutional: Negative for activity change, appetite change, fatigue, fever and unexpected weight change. HENT: Negative for congestion, rhinorrhea, sinus pressure and trouble swallowing. Respiratory: Negative for cough, chest tightness, shortness of breath and wheezing. Cardiovascular: Negative for chest pain, palpitations and leg swelling. Gastrointestinal: Negative for abdominal distention, abdominal pain, blood in stool, constipation, diarrhea, nausea and vomiting.    Genitourinary: Negative for dysuria, frequency and hematuria. Musculoskeletal: Negative for arthralgias and back pain. Skin: Negative for rash. Neurological: Negative for dizziness, weakness, light-headedness, numbness and headaches. Lab Results   Component Value Date    WBC 5.6 11/04/2021    HGB 15.6 11/04/2021    HCT 47.7 11/04/2021    MCV 92.4 11/04/2021     11/04/2021       Chemistry        Component Value Date/Time     11/04/2021 1614    K 5.1 11/04/2021 1614    CL 98 (L) 11/04/2021 1614    CO2 27 11/04/2021 1614    BUN 17 11/04/2021 1614    CREATININE 0.8 11/04/2021 1614        Component Value Date/Time    CALCIUM 9.4 11/04/2021 1614    ALKPHOS 87 11/04/2021 1614    AST 19 11/04/2021 1614    ALT 8 (L) 11/04/2021 1614    BILITOT 0.8 11/04/2021 1614          Lab Results   Component Value Date    ALT 8 (L) 11/04/2021    AST 19 11/04/2021    ALKPHOS 87 11/04/2021    BILITOT 0.8 11/04/2021       Wt Readings from Last 3 Encounters:   11/04/21 156 lb 3.2 oz (70.9 kg)   05/08/15 157 lb (71.2 kg)   05/09/14 161 lb (73 kg)     BP Readings from Last 3 Encounters:   11/04/21 (!) 140/84   05/08/15 112/60   05/09/14 118/66     Pulse Readings from Last 3 Encounters:   11/04/21 94   05/08/15 80   05/09/14 76       There were no vitals taken for this visit. Physical Exam   Unable to evaluate due to telephone w/o video encounter only    Assessment/Plan: Mehreen Lindquist was seen today for follow-up. Diagnoses and all orders for this visit:    Alzheimer's dementia without behavioral disturbance, unspecified timing of dementia onset ([facility] [address]  Comments:  I discussed with the patient and her sisters that dementia is progressive in nature and fortunately there is no cure for it as of this moment. I advised for her again to take her donepezil to help slow down the progression of her dementia and engage in any cognitive stimulating activities (e.g. reading). Essential hypertension  Comments:  I advised patient to take her BP medications as instructed.   I informed her that if her blood pressure remains uncontrolled that this could greatly increase the risk of developing a TIA/CVA and/or CAD/MI and therefore worsened her dementia. Hypertensive kidney disease  Comments:  Advised patient to take losartan and make sure that she drinks plenty of water to keep her self well-hydrated. Medical non-compliance      I reviewed the plan of care with the patient. Patient acknowledged understanding and agreed with plan of care overall. There are no discontinued medications. General information on medications:  -When it comes to medications, whether with starting or adding a new medication or increasing the dose of a current medication, the benefits and risks have to always be considered and weighed over, especially if one is taking other medications as well. -There are no medications that have no side effects and that there is always a risk involved with taking a medication.    -If a side effect were to occur with starting a new medication or with increasing the dose of a current medication that either the medication can be totally discontinued altogether or simply decrease the dose of it and if this would be the case a follow-up appointment would be deemed necessary.    -The drug allergy list will then be updated with the corresponding side effect(s) if it's deemed to be a true 'drug allergy'. -The most common adverse effects of medication(s) were addressed at today's visit.    -Lastly, the coverage status of a medication may vary from insurance to insurance and the only way to verify if the medication is covered is to send an actual prescription in.    -The drug formulary of each insurance changes without any warning or notification to the healthcare provider let alone the pharmacy.  -The cost of medications vary from insurance to insurance and the cost is always subject to change just like the drug formulary of each insurance.     Follow-up: Return in about 3 months (around 7/15/2022) for CDM - 3 mo. .     Patient was informed that if his or her symptoms worsen to follow up with me sooner or go to the nearest ER if the symptoms are very significant and warrant higher level of care. General disclaimer regarding telemedicine (virtual) visits:  Alexus Tan is a 66 y.o. female is being evaluated by a virtual visit (video visit) and/or telephone (without video) encounter to address their concern(s) as mentioned above. Prior to arranging this appointment, the patient was made aware of the need and importance to schedule the visit in this manner given the current ongoing COVID-19 pandemic. The patient was also made aware that the telemedicine service used (e.g.doxy. me) would not save let alone record any information (audio, video, and text) regarding the actual virtual visit. After this discussion, the patient acknowledged understanding and agreed to today's virtual visit encounter. A caregiver was present when appropriate as well as an  if requested. The patient is aware that telemedicine visits are a billable service just like an in person visit is. The patient was located in a state where the healthcare provider was licensed to provide care. Due to this being a TeleHealth encounter (during the OVCIV-21 public health emergency), evaluation of the following organ systems was overall limited: Vitals/Constitutional/EENT/Resp/CV/GI//MS/Neuro/Skin/Heme-Lymph-Imm. As a result, establishing a diagnosis(s) and formulating a plan of care may be overall more difficult and complicated compared to a conventional (face-to-face) office visit. The patient was made aware about the potential limitations and difficulties of a telemedicine visit such as having technical difficulties related to video and/or audio issues often stemming from a sub-optimal/poor Internet or cellular data connection and/or other factors.   If this is judged to be the case then the patient would be informed if deemed relevant and necessary that an in-person follow-up visit may be recommended. Pursuant to the emergency declaration under the [facility] [address] authority and the [employer] Act, this virtual visit was conducted with the patient's (and/or legal guardian's) consent, to reduce the patient's risk (as well as others) of exposure to COVID-19 and to continue to maintain and provide necessary medical care. The patient (and/or legal guardian) has also been advised to contact this office for worsening conditions or problems, and seek emergency medical treatment and/or call 911 if deemed necessary. Regarding my note:  -This note was composed (by me only and not with assistance via a scribe) to the best of my knowledge and recollection of the encounter with the patient using one of my own customized note templates utilizing a combination of typing and dictating with the [address] [place] [address] speech recognition software. As a result, the note may possibly contain various errors (e.g. spelling, grammar, and non-sensible words/phrases/statements) despite reviewing the note prior to signing it for completion. Time spent includes some or all of the following, both face-to-face time and non face-to-face time, but is not limited to:  [x] Preparing to see the patient by reviewing medical records available (notes, labs, imaging, etc.) prior to seeing the patient. [x] Obtaining and/or reviewing the history from the patient. [x] Performing a medically appropriate examination. [x] Ordering of relevant lab work, medications, referrals, or procedures. [x] Discussing patient's medical issues and formulating an assessment and plan. [x] Reviewing plan of care with patient. Answering any questions or concerns.    [x] Documentation within the electronic health record (EHR)  [] Reviewing records of history relevant to patient's issues after seeing the patient. [] Discussion or coordination of care with other health care professionals  [x] Other: length office visit: 22 minutes     Shawn Mas M.D.   [address] [place] [address]    Electronically signed by Dar Pate MD M.D. on 4/15/2022 at 5:53 PM.

## 2022-04-15 ENCOUNTER — TELEMEDICINE (OUTPATIENT)
Dept: FAMILY MEDICINE CLINIC | Age: 72
End: 2022-04-15
Payer: MEDICARE

## 2022-04-15 DIAGNOSIS — F02.80 ALZHEIMER'S DEMENTIA WITHOUT BEHAVIORAL DISTURBANCE, UNSPECIFIED TIMING OF DEMENTIA ONSET: Primary | ICD-10-CM

## 2022-04-15 DIAGNOSIS — I12.9 HYPERTENSIVE KIDNEY DISEASE: ICD-10-CM

## 2022-04-15 DIAGNOSIS — Z91.199 MEDICAL NON-COMPLIANCE: ICD-10-CM

## 2022-04-15 DIAGNOSIS — G30.9 ALZHEIMER'S DEMENTIA WITHOUT BEHAVIORAL DISTURBANCE, UNSPECIFIED TIMING OF DEMENTIA ONSET: Primary | ICD-10-CM

## 2022-04-15 DIAGNOSIS — I10 ESSENTIAL HYPERTENSION: ICD-10-CM

## 2022-04-15 PROCEDURE — 99443 PR PHYS/QHP TELEPHONE EVALUATION 21-30 MIN: CPT | Performed by: FAMILY MEDICINE

## 2022-04-15 ASSESSMENT — ENCOUNTER SYMPTOMS
ABDOMINAL DISTENTION: 0
COUGH: 0
VOMITING: 0
ABDOMINAL PAIN: 0
WHEEZING: 0
CONSTIPATION: 0
BACK PAIN: 0
BLOOD IN STOOL: 0
SHORTNESS OF BREATH: 0
DIARRHEA: 0
NAUSEA: 0
SINUS PRESSURE: 0
CHEST TIGHTNESS: 0
TROUBLE SWALLOWING: 0
RHINORRHEA: 0

## 2022-04-27 ENCOUNTER — TELEPHONE (OUTPATIENT)
Dept: FAMILY MEDICINE CLINIC | Age: 72
End: 2022-04-27

## 2022-04-27 NOTE — TELEPHONE ENCOUNTER
----- Message from Wabasso Raymond sent at 4/27/2022 10:14 AM EDT -----  Subject: Message to Provider    QUESTIONS  Information for Provider? Spoke with patient's POA and sister Ann Keller, she   is asking if office can contact patient's previous provider to obtain this   patient's medical records. Here is the former providers information - Dr. Dari Stanford and ph # [phone]. Please return call to advise, thank   you.  ---------------------------------------------------------------------------  --------------  CALL BACK INFO  What is the best way for the office to contact you? OK to leave message on   voicemail  Preferred Call Back Phone Number? [phone]  ---------------------------------------------------------------------------  --------------  SCRIPT ANSWERS  Relationship to Patient? Other  Representative Name? Tasneemsubhash Krishna Ismael/  and POA  Is the Representative on the appropriate HIPAA document in Epic?  Yes

## 2022-04-28 NOTE — TELEPHONE ENCOUNTER
Pt will need to sign medical release form. Spoke with Jose Vigil and advised of this. She verbalized understanding and will stop by the office.

## 2022-05-12 ENCOUNTER — TELEPHONE (OUTPATIENT)
Dept: FAMILY MEDICINE CLINIC | Age: 72
End: 2022-05-12

## 2022-05-12 NOTE — TELEPHONE ENCOUNTER
Spoke with MANDI Worthy regarding medical records. Previous medical records are in epic. Patient was dismissed from  A practice did dismiss her for noncompliance. Lori Moya states patient will not take her medication because she is afraid it will harm her.     Follow up appt scheduled 6/1/2022 @ 2pm

## 2022-08-19 ENCOUNTER — CARE COORDINATION (OUTPATIENT)
Dept: CARE COORDINATION | Age: 72
End: 2022-08-19

## 2022-08-19 SDOH — ECONOMIC STABILITY: FOOD INSECURITY: WITHIN THE PAST 12 MONTHS, YOU WORRIED THAT YOUR FOOD WOULD RUN OUT BEFORE YOU GOT MONEY TO BUY MORE.: NEVER TRUE

## 2022-08-19 SDOH — ECONOMIC STABILITY: TRANSPORTATION INSECURITY
IN THE PAST 12 MONTHS, HAS LACK OF TRANSPORTATION KEPT YOU FROM MEETINGS, WORK, OR FROM GETTING THINGS NEEDED FOR DAILY LIVING?: NO

## 2022-08-19 SDOH — ECONOMIC STABILITY: TRANSPORTATION INSECURITY
IN THE PAST 12 MONTHS, HAS THE LACK OF TRANSPORTATION KEPT YOU FROM MEDICAL APPOINTMENTS OR FROM GETTING MEDICATIONS?: NO

## 2022-08-19 SDOH — ECONOMIC STABILITY: FOOD INSECURITY: WITHIN THE PAST 12 MONTHS, THE FOOD YOU BOUGHT JUST DIDN'T LAST AND YOU DIDN'T HAVE MONEY TO GET MORE.: NEVER TRUE

## 2022-08-19 SDOH — ECONOMIC STABILITY: INCOME INSECURITY: IN THE LAST 12 MONTHS, WAS THERE A TIME WHEN YOU WERE NOT ABLE TO PAY THE MORTGAGE OR RENT ON TIME?: NO

## 2022-08-19 SDOH — ECONOMIC STABILITY: HOUSING INSECURITY
IN THE LAST 12 MONTHS, WAS THERE A TIME WHEN YOU DID NOT HAVE A STEADY PLACE TO SLEEP OR SLEPT IN A SHELTER (INCLUDING NOW)?: NO

## 2022-08-19 SDOH — ECONOMIC STABILITY: HOUSING INSECURITY: IN THE LAST 12 MONTHS, HOW MANY PLACES HAVE YOU LIVED?: 1

## 2022-08-19 ASSESSMENT — SOCIAL DETERMINANTS OF HEALTH (SDOH)
HOW OFTEN DO YOU ATTENT MEETINGS OF THE CLUB OR ORGANIZATION YOU BELONG TO?: NEVER
HOW OFTEN DO YOU ATTEND CHURCH OR RELIGIOUS SERVICES?: NEVER
DO YOU BELONG TO ANY CLUBS OR ORGANIZATIONS SUCH AS CHURCH GROUPS UNIONS, FRATERNAL OR ATHLETIC GROUPS, OR SCHOOL GROUPS?: NO
HOW OFTEN DO YOU GET TOGETHER WITH FRIENDS OR RELATIVES?: MORE THAN THREE TIMES A WEEK
HOW HARD IS IT FOR YOU TO PAY FOR THE VERY BASICS LIKE FOOD, HOUSING, MEDICAL CARE, AND HEATING?: NOT VERY HARD
IN A TYPICAL WEEK, HOW MANY TIMES DO YOU TALK ON THE PHONE WITH FAMILY, FRIENDS, OR NEIGHBORS?: MORE THAN THREE TIMES A WEEK

## 2022-08-19 NOTE — CARE COORDINATION
Ambulatory Care Coordination Note  8/19/2022    ACC: Sabrina Armijo, RN    Summary Note: ACM received call from pt younger sister, Ms. Mira Payan who is listed as pt's POA and is also listed as ok to speak with on Pts HIPAA forms. Ms. Rhianna Martin informed AC she and her siblings are looking for information, additional support and/or resources regarding caring for her sister, the patient. Ms. Rhianna Martin stated pt has been diagnosed with onset of dementia, currently lives alone in her own apartment. Between the siblings, they cook for patient, transport her to any appointments or anywhere she would need to go, grocery shop, manage pt finances, etc. Ms. Rhianna Martin and her other sister take hot meals to patient several times per week and the siblings take turns visiting with patient in her apartment. Pt will not leave to go to the beauty shop either, but the family has someone who is able to come to pt apartment to provide hair services for her. Ms. Rhianna Martin stated pt does not like, and actually refuses, to take her medication, which would be for HTN and her memory, per Ms. Rhianna Martin. Pt does not like to go out of her apartment, even to medical appointments. Ms. Rhianna Martin stated pt is very socially isolated. Ms. Rhianna Martin and her siblings would like to look into Assisted Living options for patient so that she would have someone around all the time if needed, but also they would like to work with [facility] team, ACM and SW, to try to get patient to attend medical appointments as well as take the medication prescribed to her. Ms. Rhianna Martin stated pt should be scheduling and attending a dermatology appointment, but will not go.      Ms. Rhianna Martin stated she has spoken to someone at [facility] before, she believes an  or someone in the legal department and was informed pt would be assessed and asked around 10 questions about her past. Ms. Rhianna Martin stated this may not be helpful because pt's long-term memory is not diminishing, only her short-term memory. Ms. Tameka Romero also stated she has spoke with someone from the [employer] before and they have been helpful, but she feels they need to look into other options for support as well. [facility] verbalized understanding and informed Ms. Tameka Romero I will be happy to collaborate with Gabby Jewell, our [address] [place] [address] SW about this to see what options pt and family may be able to look into as well as any other resources/organizations we might find that could be of assistance as well. Ms. Tameka Romero stated this would be very helpful. She said she will be out of town and will be returning to [place] the first week of September, right after Labor Day and if we could  the discussion at that time, that would be great. [facility] agreed with this plan and will collaborate with Gabby Jewell in the meantime and hopefully have some options to discuss with Ms. Tameka Romero upon her return. She agreed with this plan. [facility] encouraged her to let [facility] know if she has any questions, concerns, or needs prior to our next outreach. She stated she would do so and would call [facility] when she is back in town. PLAN:    Complete enrollment  Collab with [facility]  Set next outreach date just in case Ms. Tameka Romero doesn't call 1 week in Sept.     Ambulatory Care Coordination Assessment    Care Coordination Protocol  Referral from Primary Care Provider: Yes  Week 1 - Initial Assessment     Do you have all of your prescriptions and are they filled?: No (Comment: Pt refuses to take medications.)  Barriers to medication adherence: Does not feel there is a need/No perceived effect, Other  Other barriers to medication adherence: Pt refuses to take her medications. Are you able to afford your medications?: Yes  How often do you have trouble taking your medications the way you have been told to take them?: I do not have to take medications.      Do you have Home O2 Therapy?: No      Ability to seek help/take action for Emergent Urgent situations i.e. fire, crime, inclement weather or health crisis.: Needs Assistance  Ability to ambulate to restroom: Independent  Ability handle personal hygeine needs (bathing/dressing/grooming): Needs Assistance  Ability to manage Medications: Needs Assistance  Ability to prepare Food Preparation: Dependent  Ability to maintain home (clean home, laundry): Dependent  Ability to drive and/or has transportation: Dependent  Ability to do shopping: Dependent  Ability to manage finances: Dependent  Is patient able to live independently?: No     Current Housing: Apartment                 Thinking about your patient's physical health needs, are there any symptoms or problems (risk indicators) you are unsure about that require further investigation?: Mild vague physical symptoms or problems; but do not impact on daily life or are not of concern to patient   Are the patients physical health problems impacting on their mental well-being?: Moderate to severe impact upon mental well-being and preventing enjoyment of usual activities   Are there any problems with your patients lifestyle behaviors (alcohol, drugs, diet, exercise) that are impacting on physical or mental well-being?: Some mild concern of potential negative impact on well-being   Do you have any other concerns about your patients mental well-being? How would you rate their severity and impact on the patient?: Mild problems - don't interfere with function   How would you rate their home environment in terms of safety and stability (including domestic violence, insecure housing, neighbor harassment)?: Consistently safe, supportive, stable, no identified problems   How do daily activities impact on the patient's well-being? (include current or anticipated unemployment, work, caregiving, access to transportation or other):  Contributes to low mood or stress at times   How would you rate their social network (family, work, friends)?: Little participation, lonely and socially isolated   How would you rate screened out of Care Coordination on 8/19/2022 for the following reason:

## 2022-09-08 ENCOUNTER — CARE COORDINATION (OUTPATIENT)
Dept: CARE COORDINATION | Age: 72
End: 2022-09-08

## 2022-09-08 NOTE — CARE COORDINATION
ACM f/u with patient's sister, Ms. Placido Fang, today, however she had just returned from visiting with her family for several weeks and was in the middle of unpacking when ACM called. She asked if ACM could call back at a later date/time, preferably next week and ACM stated that would be fine. ACM to plan to call back then and encouraged her to call ACM prior to that if needed. PLAN:    Call back later date/time.

## 2022-09-14 ENCOUNTER — CARE COORDINATION (OUTPATIENT)
Dept: CARE COORDINATION | Age: 72
End: 2022-09-14

## 2022-09-14 NOTE — CARE COORDINATION
ACM made unsuccessful outreach call today to pt Legal Leanor Rings. ACM left VM message with contact information and will make another outreach attempt at a later date/time.

## 2022-10-11 ENCOUNTER — CARE COORDINATION (OUTPATIENT)
Dept: CARE COORDINATION | Age: 72
End: 2022-10-11

## 2022-10-11 NOTE — CARE COORDINATION
Ambulatory Care Coordination Note  10/11/2022    ACC: Criss Vital RN    [facility] received incoming call from pt sister, Ms. Worthy. She stated she and her family are still looking for ways in which to keep patient home longer and safely. She is still concerned pt reuses to leave her home to go to the doctor, she will take her BP medication, but not her medication for her memory. Ms. Zeferino Hwang stated last year, pt's insurance company sent a nurse out for a visit and the visit went well at that time, but did not go as well this time. Ms. Zeferino Hwang reports pt likes to be independent, but she will also do things and say things such as replace her dish liquid with water and laundry detergent with water as she is afraid the other liquids in there are poisonous. [facility] informed Ms. Zeferino Hwang about the [employer] and the Alzheimer's Association, and provided [place]'s contact information to Ms. Worthy. [facility] encouraged her to call as their program can help support families and patients with dementia and alzheimers with resources and local support as well. Ms. Zeferino Hwang thanked [facility] for the information and agreed to call [facility] back if she is unable to reach [place]. [facility] encouraged her to call for any reason, questions or concerns and she agreed with this plan. Offered patient enrollment in the Remote Patient Monitoring (RPM) program for in-home monitoring: Patient is not eligible for RPM program.    PLAN:  F/u to ensure they connected with [place] and [employer]. Lab Results       None            Care Coordination Interventions    Referral from Primary Care Provider: Yes  Suggested Interventions and [employer] on Wheels: Not Started  Senior Services: Not Started (Comment: COA-sister has spoken to them)  Social Work: Not Started (Comment: Refer to [facility])  Specialty Services Referral: In Process (Comment: sister/family currently communicating with Alz.  Assoc.)          Goals Addressed                      This Visit's Progress      Patient Stated: Pt family looking for support/resources/information on how to best care for patient while at home. Would like to look into Assisted Living options and what the process would be. (pt-stated)   On track      Patient Stated: Pt family looking for support/resources/information on how to best care for patient while at home. Would like to look into Assisted Living options and what the process would be. Barriers: impairment:  cognitive, overwhelmed by complexity of regimen, stress, and lack of education  Plan for overcoming my barriers: Pt family agrees to work with ACM and CC SW to discuss possible options, organizations that may offer resources, education and support. Confidence: 7/10  Anticipated Goal Completion Date: 11/19/2022    [id number]- Pt sister, Mile Pacheco, called and received Peter Giordano's contact information for the Alzheimer's Association [facility]. Angely Lane                Prior to Admission medications    Medication Sig Start Date End Date Taking? Authorizing Provider   losartan (COZAAR) 25 MG tablet Take 1 tablet by mouth nightly  Patient not taking: No sig reported 11/10/21   Mamta Gurrola MD   amLODIPine (NORVASC) 5 MG tablet Take 1 tablet by mouth every morning  Patient not taking: No sig reported 11/4/21   Mamta Gurrola MD   donepezil (ARICEPT) 5 MG tablet Take 1 tablet by mouth nightly  Patient not taking: No sig reported 11/4/21   Mamta Gurrola MD       No future appointments. ,   General Assessment    Do you have any symptoms that are causing concern?: No     , Care Coordination Episodes    Type: Amb Care Management  Episode: Rising Risk Management  Noted: 8/19/2022, and This patient was permanently screened out of Care Coordination on 10/11/2022 for the following reason:

## 2022-11-09 ENCOUNTER — CARE COORDINATION (OUTPATIENT)
Dept: CARE COORDINATION | Age: 72
End: 2022-11-09

## 2022-11-09 NOTE — CARE COORDINATION
Ambulatory Care Coordination Note  11/9/2022    ACC: Hamlet Pastrana RN    ACM f/u with patient sister, Gokul Adorno today. She stated they have decided for the time being to keep patient in her current apartment and they will not renew her lease when it's up in the Spring. Gokul Adorno said at that time, they will move patient into an Assisted Living and they are looking to see what organization will be a good fit for the patient when it's time to move. ACM encouraged Gokul Adorno to call if any questions or concerns arise and she agreed with this plan, stating she will keep [facility]'s contact number and call when needed. PLAN:    Goal complete, pt to move to AL when apartment lease it up in Spring    Offered patient enrollment in the Remote Patient Monitoring (RPM) program for in-home monitoring: Patient is not eligible for RPM program.    Lab Results       None            Care Coordination Interventions    Referral from Primary Care Provider: Yes  Suggested Interventions and [employer] on Wheels: Not Started  Senior Services: Not Started (Comment: COA-sister has spoken to them)  Social Work: Not Started (Comment: Refer to [address])  Specialty Services Referral: In Process (Comment: sister/family currently communicating with Alz. Assoc.)          Goals Addressed                      This Visit's Progress      COMPLETED: Patient Stated: Pt family looking for support/resources/information on how to best care for patient while at home. Would like to look into Assisted Living options and what the process would be. (pt-stated)   On track      Patient Stated: Pt family looking for support/resources/information on how to best care for patient while at home. Would like to look into Assisted Living options and what the process would be.     Barriers: impairment:  cognitive, overwhelmed by complexity of regimen, stress, and lack of education  Plan for overcoming my barriers: Pt family agrees to work with ACJENNIFER and CC DIPESH to discuss possible options, organizations that may offer resources, education and support. Confidence: 7/10  Anticipated Goal Completion Date: 11/19/2022    [id number]- Pt sister, Julianna Bhagat, called and received Maria Teresa Giordano's contact information for the Alzheimer's Association of [place]. CR    11.9.22- Spoke with [facility] [place] will be to keep patient in her apartment until lease is up and then move her to assisted living. Viky Judge                Prior to Admission medications    Medication Sig Start Date End Date Taking?  Authorizing Provider   losartan (COZAAR) 25 MG tablet Take 1 tablet by mouth nightly  Patient not taking: No sig reported 11/10/21   Yuli Cuadra MD   amLODIPine (NORVASC) 5 MG tablet Take 1 tablet by mouth every morning  Patient not taking: No sig reported 11/4/21   Yuli Cuadra MD   donepezil (ARICEPT) 5 MG tablet Take 1 tablet by mouth nightly  Patient not taking: No sig reported 11/4/21   Yuli Cuadra MD       No future appointments., No linked episodes, and This patient was permanently screened out of Care Coordination on 11/9/2022 for the following reason:

## 2023-01-12 ENCOUNTER — CARE COORDINATION (OUTPATIENT)
Dept: CARE COORDINATION | Age: 73
End: 2023-01-12

## 2023-01-12 NOTE — CARE COORDINATION
Ambulatory Care Coordination Note  1/12/2023    ACC: Christiano Snowden, RN    AC received call from pt sister and LG, Ruddyjennifer Crockett. Ms. Susan Rosa stated pt lease for her apartment will be over in July and they would like to find an AL or something equivalent to that type of living environment for her sister before then, if possible. Ms. Susan Rosa reports she and her siblings check on pt daily, bring warm meals to her and visit with her, however, pt is no longer bathing, combing her hair, will not go the doctor, dentist or for her eye exams and she gets agitated when the family makes suggestions for her to go to these appointments/places. Ms. Skylar Solorio stated they used to have a family friend come to pt apartment to fix her hair, but pt will no longer let the family friend come over to do this. Ms. Skylar Solorio stated pt does have a visiting nurse practitioner coming to her home and her next visit will be on the 25th of this month. Ms. Skylar Solorio stated she has communicated with [facility] and also with [facility]. [facility] provided her with Steve Giordano's information at the Alzheimer's Association and also educated her on how to find the Housing Search option on [email or web address] on [employer] site. ACM explained she can choose specific criteria to search for using the tool on the web site. She thanked [facility] for the assistance today and stated she would stay in communication with [facility] on any progress, questions or concerns that may arise. [facility] agreed with this plan and thanked her for calling. PLAN:    Complete enrollment  F/u housing search    Offered patient enrollment in the Remote Patient Monitoring (RPM) program for in-home monitoring: Patient is not eligible for RPM program and NA.     Ambulatory Care Coordination Assessment    Care Coordination Protocol  Referral from Primary Care Provider: No  Week 1 - Initial Assessment     Do you have all of your prescriptions and are they filled?: No (Comment: Pt refuses to take medications. Confirmed with sister on 1.11.23 CR)  Barriers to medication adherence: Does not feel there is a need/No perceived effect, Other  Other barriers to medication adherence: Pt refuses to take her medications. Are you able to afford your medications?: Yes  How often do you have trouble taking your medications the way you have been told to take them?: I do not have to take medications. Do you have Home O2 Therapy?: No      Ability to seek help/take action for Emergent Urgent situations i.e. fire, crime, inclement weather or health crisis.: Needs Assistance  Ability to ambulate to restroom: Independent  Ability handle personal hygeine needs (bathing/dressing/grooming): Needs Assistance  Ability to manage Medications: Needs Assistance  Ability to prepare Food Preparation: Dependent  Ability to maintain home (clean home, laundry): Dependent  Ability to drive and/or has transportation: Dependent  Ability to do shopping: Dependent  Ability to manage finances: Dependent  Is patient able to live independently?: No     Current Housing: Apartment        Per the Fall Risk Screening, did the patient have 2 or more falls or 1 fall with injury in the past year?: No     Frequent urination at night?: No  Do you use rails/bars?: Yes  Do you have a non-slip tub mat?: Yes     Are you experiencing loss of meaning?: No  Are you experiencing loss of hope and peace?: No     Suggested Interventions and Community Resources   Other Services or Interventions: Pt siblings looking for resources to help them decide where to place pt when she moves from her apartment ot AL                  Prior to Admission medications    Medication Sig Start Date End Date Taking?  Authorizing Provider   losartan (COZAAR) 25 MG tablet Take 1 tablet by mouth nightly  Patient not taking: No sig reported 11/10/21   Paola Estrada MD   amLODIPine (NORVASC) 5 MG tablet Take 1 tablet by mouth every morning  Patient not taking: No sig reported 11/4/21   Vivienne Ghosh MD   donepezil (ARICEPT) 5 MG tablet Take 1 tablet by mouth nightly  Patient not taking: No sig reported 11/4/21   Vivienne Ghosh MD       No future appointments.   ,   General Assessment    Do you have any symptoms that are causing concern?: No     , and Care Coordination Episodes    Type: Amb Care Management  Episode: Rising Risk Care Management  Noted: 1/12/2023

## 2023-02-09 ENCOUNTER — CARE COORDINATION (OUTPATIENT)
Dept: CARE COORDINATION | Age: 73
End: 2023-02-09

## 2023-02-09 NOTE — CARE COORDINATION
Ambulatory Care Coordination Note  2023    Patient Current Location:  Home: [address] [place] [address] Dr. Gordon Forbes [address] [address]     ACM contacted the family by telephone. Verified name and  with family as identifiers. Provided introduction to self, and explanation of the ACM role. Challenges to be reviewed by the provider   Additional needs identified to be addressed with provider: No  none               Method of communication with provider: none. ACM: Aly Licea RN    ACM f/u with pt sister, Ms. Constanza Laird, today. She stated pt is doing about the same, continues to live in her apartment for now, but refuses to leave to go anywhere. They were able to get a nurse practitioner from her insurance company to come assess her recently. She does have an appointment with Dr. Marissa Guzmán scheduled for 23, still and plans to attend. Ms. Constanza Laird stated she and her other sister continue to look for and tour AL facilities. She said pt is currently 6th on the waiting list at [facility], as they are looking for a facility close to them so they can continue to visit her 1-2 times per week. The lease for pt current apartment is not up until 2023, however they are wanting to find an [employer] facility for her by July or August. Ms. Constanza Laird has recently received a list from [place] [facility] [employer] facilities near her side of town and she will continue to look into these with her other sister until they are alb to find a good fit for pt. ACM encouraged her to call if any questions arise and she agreed to do so. PLAN: f/u if found AL facility, or need any other resources. Offered patient enrollment in the Remote Patient Monitoring (RPM) program for in-home monitoring: NA.     Lab Results       None            Care Coordination Interventions    Referral from Primary Care Provider: No  Suggested Interventions and Community Resources  Other Services or Interventions: Pt siblings looking for resources to help them decide where to place pt when she moves from her apartment ot AL          Goals Addressed                      This Visit's Progress      Patient Stated (pt-stated)   On track      Pt family looking for resources, guidance, support while they look for an AL or different living environment where pt will not be alone. She currently lives in an apartment by herself and family checks in with her daily, however her lease will be over in July and family would like to find a place before that time. Barriers: impairment:  cognitive and Pt is resistant to change, leaving her apartment, going to medical appts, hair appts, etc.   Plan for overcoming my barriers: Pt family working with [facility] and local community agencies while they look for AL or similar place for patient to move to.   Confidence: 8/10  Anticipated Goal Completion Date: 4/12/2023              Future Appointments   Date Time Provider Thuan Chaudhari   2/22/2023  1:00 PM Kingston Mcdermott MD [facility]   ,   General Assessment    Do you have any symptoms that are causing concern?: No     , and Care Coordination Episodes    Type: Amb Care Management  Episode: Rising Risk Care Management  Noted: 1/12/2023

## 2023-02-22 ENCOUNTER — TELEPHONE (OUTPATIENT)
Dept: FAMILY MEDICINE CLINIC | Age: 73
End: 2023-02-22

## 2023-02-22 NOTE — TELEPHONE ENCOUNTER
Pt had an appt today but canceled due to \"refusing to leave home\". Pt's sister/POA called prior to appt time. Pt has not been seen in person since January 2022 and last virtual appt was April 2022. Pt has alzheimer's/dementia. Spoke with Dr. Catrina Paula who agreed that pt may benefit from home health so that a nurse can check vitals. Possibly a company where a provider can go to pt's home for medication and appointments and take over pt's care since pt refuses to leave. Pt's sister/POA is open to home health and is aware that we are reaching out to care coordination in regards to this. She did state that [facility] did sent a provider to the house recently for a home visit but it was only once. Pt's sister is going to get us the information from that visit.

## 2023-02-22 NOTE — TELEPHONE ENCOUNTER
Noted. Thank you for attending to this. I agree with everything that was stated. I did discuss this plan of care with my medical assistant. Shawn Cisneros 177

## 2023-02-24 ENCOUNTER — CARE COORDINATION (OUTPATIENT)
Dept: CARE COORDINATION | Age: 73
End: 2023-02-24

## 2023-02-24 DIAGNOSIS — F02.C18 SEVERE ALZHEIMER'S DEMENTIA OF OTHER ONSET WITH OTHER BEHAVIORAL DISTURBANCE (HCC): Primary | ICD-10-CM

## 2023-02-24 DIAGNOSIS — G30.8 SEVERE ALZHEIMER'S DEMENTIA OF OTHER ONSET WITH OTHER BEHAVIORAL DISTURBANCE (HCC): Primary | ICD-10-CM

## 2023-02-24 NOTE — CARE COORDINATION
CORDELIA called and spoke with Ms. Worthy today. We discussed her thoughts for the next step to take at this time regarding pt missing her in-person appointment with Dr. Cristina Amezquita this week. Ms. Mellisa Delacruz stated she would ideally like to have her sister evaluated as she believes there is something going on with her mental health. She stated she was never given a mental health diagnosis, but she thinks there may be one that has gone undiagnosed. She asked if pt could be admitted to the hospital for an evaluation. CORDELIA explained I will be happy to reach out to Dr. Cristina Amezquita to see what his thoughts are about this, but I'm not sure if it's possible to send a referral for an admission for evaluation. Maybe for a mental health unit, but I'm not sure. I will send a message to Dr. Cristina Amezquita, though, to find out if this is a possibility. CORDELIA explained from what I typically see, a patient enters such an evaluation after a visit to the ED, where they may be admitted for an evaluation or observation. Ms. Mellisa Delacruz voiced understanding and stated even if a referral could be placed for an evaluation, she isn't sure how she and her siblings would get pt to the hospital without lying it. Another possible solution could be to bring palliative care on board. They could provide an initial assessment and possibly help in a further mental health evaluation. [facility] provided Ms. Mellisa Delacruz with three local palliative care organizations and will also send a list of more organizations available to Ms. Worthy's home address, as she requested. [address]    DENA mentioned [facility] as one of the palliative care organizations, explaining they are also a visiting medical provider service. Ms. Mellisa Delacruz stated she would start calling these organizations and ask how they might be able to help support the patient and patient family in this process.  The family still continues to look into AL facilities and is still hopeful [facility] in [facility] will have an opening prior to pt's current lease expiring. Ms. Carley Escobar stated she would f/u with ACM and provide an update in another week or so. ACM thanked her for taking the call today and encouraged her to call with any additional questions or concerns.      PLAN:   Request order for palliative care  F/u pt family plans

## 2023-03-08 ENCOUNTER — CARE COORDINATION (OUTPATIENT)
Dept: CARE COORDINATION | Age: 73
End: 2023-03-08

## 2023-03-08 NOTE — CARE COORDINATION
[facility] called to find out if Ms. Worthy, pt LG, has heard from [facility] to schedule an appointment yet. She stated she has not heard from them at this time. [facility] provided Ms. Worthy with the contact information to call and schedule with the first available provider at [facility]. She thanked [facility] for this information and [facility] also informed her there is a referral available for palliative care if she and her family choose to pursue that option as well. ACM to f/u with her at a later date/time. Ms. Bhanu Rivera agreed with this plan.

## 2023-05-31 ENCOUNTER — CARE COORDINATION (OUTPATIENT)
Dept: CARE COORDINATION | Age: 73
End: 2023-05-31

## 2023-06-02 ENCOUNTER — CARE COORDINATION (OUTPATIENT)
Dept: CARE COORDINATION | Age: 73
End: 2023-06-02

## 2023-06-02 NOTE — CARE COORDINATION
CORDELIA attempted to f/u with pt sister, Ms. Oksana Grubbs, today. She answered and asked if she could call CORDELIA back. CORDELIA agreed with this plan and thanked her for her time. Attempting to f/u about plans for moving into an assisted living facility when pt current apartment lease is up.

## 2023-06-05 ENCOUNTER — CARE COORDINATION (OUTPATIENT)
Dept: CARE COORDINATION | Age: 73
End: 2023-06-05

## 2023-06-05 NOTE — CARE COORDINATION
CORDELIA f/u with pt LG/sister, Ms. Lety Lakhani, today. She stated everything is progressing and she said she and her other sister were able to convince pt to go to the [employer]. They had been working to get her to leave her home and go to the beauty shop for quite some time, and finally pt agreed to do so and enjoyed it. Ms. Lety Lakhani also stated pt is aware she will be moving once her current lease is over, which will be in July. She said the family is currently looking at [facility] in [facility] and they are working with a representative from [employer] to find assisted/senior living for patient. They will plan to meet with the [facility] and [facility] this week. Ms. Lety Lakhani reports pt has met with the neurologist and had an EEG completed. She will be having a CT scan this month and then f/u with neuro again at that time. CORDELIA encouraged Ms. Worthy to call if anything comes up and they have any questions or concerns and she agreed with this plan.      PLAN:    F/u with pt and family until move to AL/Senior Living in July

## 2023-06-21 ENCOUNTER — OFFICE VISIT (OUTPATIENT)
Dept: FAMILY MEDICINE CLINIC | Age: 73
End: 2023-06-21
Payer: MEDICARE

## 2023-06-21 VITALS
SYSTOLIC BLOOD PRESSURE: 132 MMHG | HEART RATE: 93 BPM | WEIGHT: 155 LBS | OXYGEN SATURATION: 97 % | BODY MASS INDEX: 29.29 KG/M2 | DIASTOLIC BLOOD PRESSURE: 80 MMHG

## 2023-06-21 DIAGNOSIS — Z23 IMMUNIZATION DUE: ICD-10-CM

## 2023-06-21 DIAGNOSIS — N18.2 STAGE 2 CHRONIC KIDNEY DISEASE DUE TO BENIGN HYPERTENSION: ICD-10-CM

## 2023-06-21 DIAGNOSIS — Z13.228 SCREENING FOR ENDOCRINE, NUTRITIONAL, METABOLIC AND IMMUNITY DISORDER: ICD-10-CM

## 2023-06-21 DIAGNOSIS — Z13.21 SCREENING FOR ENDOCRINE, NUTRITIONAL, METABOLIC AND IMMUNITY DISORDER: ICD-10-CM

## 2023-06-21 DIAGNOSIS — N18.2 CKD STAGE G2/A3, GFR 60-89 AND ALBUMIN CREATININE RATIO >300 MG/G: ICD-10-CM

## 2023-06-21 DIAGNOSIS — G30.9 SEVERE ALZHEIMER'S DEMENTIA, UNSPECIFIED TIMING OF DEMENTIA ONSET, UNSPECIFIED WHETHER BEHAVIORAL, PSYCHOTIC, OR MOOD DISTURBANCE OR ANXIETY (HCC): Primary | ICD-10-CM

## 2023-06-21 DIAGNOSIS — Z13.29 SCREENING FOR ENDOCRINE, NUTRITIONAL, METABOLIC AND IMMUNITY DISORDER: ICD-10-CM

## 2023-06-21 DIAGNOSIS — R23.4 SCAB: ICD-10-CM

## 2023-06-21 DIAGNOSIS — I12.9 STAGE 2 CHRONIC KIDNEY DISEASE DUE TO BENIGN HYPERTENSION: ICD-10-CM

## 2023-06-21 DIAGNOSIS — Z91.199 MEDICAL NON-COMPLIANCE: ICD-10-CM

## 2023-06-21 DIAGNOSIS — F02.C0 SEVERE ALZHEIMER'S DEMENTIA, UNSPECIFIED TIMING OF DEMENTIA ONSET, UNSPECIFIED WHETHER BEHAVIORAL, PSYCHOTIC, OR MOOD DISTURBANCE OR ANXIETY (HCC): Primary | ICD-10-CM

## 2023-06-21 DIAGNOSIS — Z13.0 SCREENING FOR ENDOCRINE, NUTRITIONAL, METABOLIC AND IMMUNITY DISORDER: ICD-10-CM

## 2023-06-21 LAB
BACTERIA URNS QL MICRO: NORMAL /HPF
BASOPHILS # BLD: 0.1 K/UL (ref 0–0.2)
BASOPHILS NFR BLD: 0.9 %
BILIRUB UR QL STRIP.AUTO: NEGATIVE
CLARITY UR: CLEAR
COLOR UR: YELLOW
CREAT UR-MCNC: 120.5 MG/DL (ref 28–259)
DEPRECATED RDW RBC AUTO: 15 % (ref 12.4–15.4)
EOSINOPHIL # BLD: 0.1 K/UL (ref 0–0.6)
EOSINOPHIL NFR BLD: 2.5 %
EPI CELLS #/AREA URNS AUTO: 4 /HPF (ref 0–5)
GLUCOSE UR STRIP.AUTO-MCNC: NEGATIVE MG/DL
HCT VFR BLD AUTO: 44.1 % (ref 36–48)
HGB BLD-MCNC: 14.6 G/DL (ref 12–16)
HGB UR QL STRIP.AUTO: NEGATIVE
HYALINE CASTS #/AREA URNS AUTO: 0 /LPF (ref 0–8)
KETONES UR STRIP.AUTO-MCNC: NEGATIVE MG/DL
LEUKOCYTE ESTERASE UR QL STRIP.AUTO: NEGATIVE
LYMPHOCYTES # BLD: 1.4 K/UL (ref 1–5.1)
LYMPHOCYTES NFR BLD: 25.3 %
MCH RBC QN AUTO: 30.9 PG (ref 26–34)
MCHC RBC AUTO-ENTMCNC: 33 G/DL (ref 31–36)
MCV RBC AUTO: 93.6 FL (ref 80–100)
MICROALBUMIN UR DL<=1MG/L-MCNC: <1.2 MG/DL
MICROALBUMIN/CREAT UR: NORMAL MG/G (ref 0–30)
MONOCYTES # BLD: 0.5 K/UL (ref 0–1.3)
MONOCYTES NFR BLD: 8.1 %
NEUTROPHILS # BLD: 3.5 K/UL (ref 1.7–7.7)
NEUTROPHILS NFR BLD: 63.2 %
NITRITE UR QL STRIP.AUTO: NEGATIVE
PH UR STRIP.AUTO: 7.5 [PH] (ref 5–8)
PLATELET # BLD AUTO: 221 K/UL (ref 135–450)
PMV BLD AUTO: 7.8 FL (ref 5–10.5)
PROT UR STRIP.AUTO-MCNC: ABNORMAL MG/DL
RBC # BLD AUTO: 4.72 M/UL (ref 4–5.2)
RBC CLUMPS #/AREA URNS AUTO: 3 /HPF (ref 0–4)
SP GR UR STRIP.AUTO: 1.02 (ref 1–1.03)
UA DIPSTICK W REFLEX MICRO PNL UR: YES
URN SPEC COLLECT METH UR: ABNORMAL
UROBILINOGEN UR STRIP-ACNC: 1 E.U./DL
WBC # BLD AUTO: 5.6 K/UL (ref 4–11)
WBC #/AREA URNS AUTO: 1 /HPF (ref 0–5)

## 2023-06-21 PROCEDURE — 99214 OFFICE O/P EST MOD 30 MIN: CPT | Performed by: FAMILY MEDICINE

## 2023-06-21 PROCEDURE — 1090F PRES/ABSN URINE INCON ASSESS: CPT | Performed by: FAMILY MEDICINE

## 2023-06-21 PROCEDURE — 90677 PCV20 VACCINE IM: CPT | Performed by: FAMILY MEDICINE

## 2023-06-21 PROCEDURE — 3017F COLORECTAL CA SCREEN DOC REV: CPT | Performed by: FAMILY MEDICINE

## 2023-06-21 PROCEDURE — 1036F TOBACCO NON-USER: CPT | Performed by: FAMILY MEDICINE

## 2023-06-21 PROCEDURE — 3075F SYST BP GE 130 - 139MM HG: CPT | Performed by: FAMILY MEDICINE

## 2023-06-21 PROCEDURE — G8419 CALC BMI OUT NRM PARAM NOF/U: HCPCS | Performed by: FAMILY MEDICINE

## 2023-06-21 PROCEDURE — G0009 ADMIN PNEUMOCOCCAL VACCINE: HCPCS | Performed by: FAMILY MEDICINE

## 2023-06-21 PROCEDURE — 1123F ACP DISCUSS/DSCN MKR DOCD: CPT | Performed by: FAMILY MEDICINE

## 2023-06-21 PROCEDURE — 3079F DIAST BP 80-89 MM HG: CPT | Performed by: FAMILY MEDICINE

## 2023-06-21 PROCEDURE — G8427 DOCREV CUR MEDS BY ELIG CLIN: HCPCS | Performed by: FAMILY MEDICINE

## 2023-06-21 PROCEDURE — G8400 PT W/DXA NO RESULTS DOC: HCPCS | Performed by: FAMILY MEDICINE

## 2023-06-22 LAB
ALBUMIN SERPL-MCNC: 4.4 G/DL (ref 3.4–5)
ALP SERPL-CCNC: 87 U/L (ref 40–129)
ALT SERPL-CCNC: 8 U/L (ref 10–40)
ANION GAP SERPL CALCULATED.3IONS-SCNC: 16 MMOL/L (ref 3–16)
AST SERPL-CCNC: 17 U/L (ref 15–37)
BILIRUB DIRECT SERPL-MCNC: <0.2 MG/DL (ref 0–0.3)
BILIRUB INDIRECT SERPL-MCNC: ABNORMAL MG/DL (ref 0–1)
BILIRUB SERPL-MCNC: 0.7 MG/DL (ref 0–1)
BUN SERPL-MCNC: 21 MG/DL (ref 7–20)
CALCIUM SERPL-MCNC: 8.9 MG/DL (ref 8.3–10.6)
CHLORIDE SERPL-SCNC: 105 MMOL/L (ref 99–110)
CHOLEST SERPL-MCNC: 278 MG/DL (ref 0–199)
CO2 SERPL-SCNC: 22 MMOL/L (ref 21–32)
CREAT SERPL-MCNC: 0.9 MG/DL (ref 0.6–1.2)
EST. AVERAGE GLUCOSE BLD GHB EST-MCNC: 111.2 MG/DL
GFR SERPLBLD CREATININE-BSD FMLA CKD-EPI: >60 ML/MIN/{1.73_M2}
GLUCOSE SERPL-MCNC: 96 MG/DL (ref 70–99)
HAV IGM SERPL QL IA: NORMAL
HBA1C MFR BLD: 5.5 %
HBV CORE IGM SERPL QL IA: NORMAL
HBV SURFACE AG SERPL QL IA: NORMAL
HCV AB SERPL QL IA: NORMAL
HDLC SERPL-MCNC: 80 MG/DL (ref 40–60)
LDLC SERPL CALC-MCNC: 161 MG/DL
PHOSPHATE SERPL-MCNC: 2.8 MG/DL (ref 2.5–4.9)
POTASSIUM SERPL-SCNC: 4.6 MMOL/L (ref 3.5–5.1)
PROT SERPL-MCNC: 6.9 G/DL (ref 6.4–8.2)
SODIUM SERPL-SCNC: 143 MMOL/L (ref 136–145)
T4 FREE SERPL-MCNC: 1.6 NG/DL (ref 0.9–1.8)
TRIGL SERPL-MCNC: 185 MG/DL (ref 0–150)
TSH SERPL DL<=0.005 MIU/L-ACNC: 0.33 UIU/ML (ref 0.27–4.2)
VLDLC SERPL CALC-MCNC: 37 MG/DL

## 2023-07-06 ENCOUNTER — CARE COORDINATION (OUTPATIENT)
Dept: CARE COORDINATION | Age: 73
End: 2023-07-06

## 2023-07-06 NOTE — CARE COORDINATION
No outreach from [facility] at this time as pt sister, June Saul, will be contacting [facility] in the near future as pt is to be moving in July or August into AL. [facility] to f/u with Maddy Rondon if no contact from her by August 2023.

## 2023-07-27 ENCOUNTER — TELEPHONE (OUTPATIENT)
Dept: FAMILY MEDICINE CLINIC | Age: 73
End: 2023-07-27

## 2023-08-02 ENCOUNTER — TELEPHONE (OUTPATIENT)
Dept: FAMILY MEDICINE CLINIC | Age: 73
End: 2023-08-02

## 2023-08-02 DIAGNOSIS — G30.9 SEVERE ALZHEIMER'S DEMENTIA, UNSPECIFIED TIMING OF DEMENTIA ONSET, UNSPECIFIED WHETHER BEHAVIORAL, PSYCHOTIC, OR MOOD DISTURBANCE OR ANXIETY (HCC): Primary | ICD-10-CM

## 2023-08-02 DIAGNOSIS — G47.9 SLEEP DISTURBANCE: ICD-10-CM

## 2023-08-02 DIAGNOSIS — F02.C0 SEVERE ALZHEIMER'S DEMENTIA, UNSPECIFIED TIMING OF DEMENTIA ONSET, UNSPECIFIED WHETHER BEHAVIORAL, PSYCHOTIC, OR MOOD DISTURBANCE OR ANXIETY (HCC): Primary | ICD-10-CM

## 2023-08-02 RX ORDER — RAMELTEON 8 MG/1
8 TABLET ORAL NIGHTLY PRN
Qty: 15 TABLET | Refills: 0 | Status: SHIPPED | OUTPATIENT
Start: 2023-08-02 | End: 2023-08-17

## 2023-08-02 NOTE — TELEPHONE ENCOUNTER
Pt sister called stating they just moved pt into assisted living and is wondering if she could get a medication called in for her dementia, according to the nurse at assisted living, to help calm her down with being in a new environment for the first time. Pt had a physical 6/21/2023. Please advise!    Pharm[place]-[facility]

## 2023-08-02 NOTE — TELEPHONE ENCOUNTER
Pts sister called again, stating her sister cannot spend another night like she did last night in her new environment. Routing again per [facility] so she can notify Dr. Helon Libman. Pts sister reiterated pharmacy is [facility].

## 2023-08-03 NOTE — TELEPHONE ENCOUNTER
Please inform the patient's sister that I understand how difficult it can be for the patient in question to adjust to a different environment. I will send in a medication called Ramelteon. It's similar to Melatonin and hopefully she can fall asleep and remain asleep easier. I would recommend that the patient's guardian arrange a time (via appointment) for me to speak to her about the welfare of her sister. Shawn TANG  [facility] [address]

## 2023-08-04 NOTE — TELEPHONE ENCOUNTER
Patient was admitted [facility] on Wednesday due difficulty adjusting and locked staff out of home then took knife out of kitchen drawer so sisters called 911. Patient is being evaluated medications adjusted to help her cope.

## 2023-08-05 NOTE — TELEPHONE ENCOUNTER
Noted. Thank you for attending to this. I hope that Ms. Constanza Hogan gets the care that she needs. Shawn TANG  [facility] [address]

## 2023-08-08 ENCOUNTER — CARE COORDINATION (OUTPATIENT)
Dept: CARE COORDINATION | Age: 73
End: 2023-08-08

## 2023-08-08 NOTE — CARE COORDINATION
CORDELIA attempted to f/u with pt sister, Beba Villareal, today. She answered the phone and stated she was currently working the election polls and would call CORDELIA back. CORDELIA thanked her and the call ended.

## 2023-08-31 ENCOUNTER — CARE COORDINATION (OUTPATIENT)
Dept: CARE COORDINATION | Age: 73
End: 2023-08-31

## 2023-09-07 ENCOUNTER — CARE COORDINATION (OUTPATIENT)
Dept: CARE COORDINATION | Age: 73
End: 2023-09-07

## 2023-12-27 NOTE — PROGRESS NOTES
Deniz Priest   74 y.o. female   1950    HPI:    Patient was last seen by me on 6/21/2023. Reason(s) for visit:   Chief Complaint   Patient presents with    6 Month Follow-Up     -Interval history-    [x] No new significant health event(s)/problem(s) occurred since our last office visit. [x] No new health issues/concerns discussed during today's office visit. [] New health issue(s)/concern(s):    [] Other noteworthy comment(s):     -Chronic health issue(s)-    HTN:  -No hx of MI, TIA/CVA. -No hx of stress test, LHC.  -Food: [employer] - pizza. Frozen food.  -Patient has not been checking BP readings regularly  -Patient denied issues with frequent headache, chest pain, shortness of breath, palpitations, malaise, etc.  -Last eye exam: long time ago     Dementia:  -Onset: 2018/2019.   -No known  [address] of dementia  -No hx of TIA/CVA  -No hx of head trauma    Updates:  -Since her last office visit, the patient with the assistance of her 2 sisters who accompanied her during today's visit has moved into a \"family home\". One of the sisters knows the owner of an assisted facility specialized with elderly patients who have dementia called [address] [place] [address]. Each \"family home\" is essentially a condominium. The patient receives 24-hour care including those trimming her nails and feeding her.  -Per patient's sisters, they are very satisfied with the care and they are has fortunately not been any incidents or any issues of concern of any kind.  -The patient initially had issues with irritability and agitation with transitioning from her old apartment into this new facility, but overall she likes her new home.  -Her sisters have remarked that her cognition has overall remained stable.   No new noteworthy events suggestive of cognitive decline were shared with me today.  -One of the sisters remarked about how the patient in question has issues of irritability and agitation at times that are not

## 2023-12-28 ENCOUNTER — OFFICE VISIT (OUTPATIENT)
Dept: FAMILY MEDICINE CLINIC | Age: 73
End: 2023-12-28
Payer: MEDICARE

## 2023-12-28 VITALS
SYSTOLIC BLOOD PRESSURE: 154 MMHG | HEART RATE: 90 BPM | HEIGHT: 61 IN | OXYGEN SATURATION: 97 % | WEIGHT: 148.4 LBS | DIASTOLIC BLOOD PRESSURE: 100 MMHG | BODY MASS INDEX: 28.02 KG/M2

## 2023-12-28 DIAGNOSIS — G30.9 SEVERE ALZHEIMER'S DEMENTIA, UNSPECIFIED TIMING OF DEMENTIA ONSET, UNSPECIFIED WHETHER BEHAVIORAL, PSYCHOTIC, OR MOOD DISTURBANCE OR ANXIETY (HCC): Primary | ICD-10-CM

## 2023-12-28 DIAGNOSIS — I12.9 HYPERTENSIVE KIDNEY DISEASE: ICD-10-CM

## 2023-12-28 DIAGNOSIS — F02.C0 SEVERE ALZHEIMER'S DEMENTIA, UNSPECIFIED TIMING OF DEMENTIA ONSET, UNSPECIFIED WHETHER BEHAVIORAL, PSYCHOTIC, OR MOOD DISTURBANCE OR ANXIETY (HCC): Primary | ICD-10-CM

## 2023-12-28 DIAGNOSIS — Z91.199 MEDICAL NON-COMPLIANCE: ICD-10-CM

## 2023-12-28 DIAGNOSIS — I10 ESSENTIAL HYPERTENSION: ICD-10-CM

## 2023-12-28 DIAGNOSIS — N18.2 CKD STAGE G2/A3, GFR 60-89 AND ALBUMIN CREATININE RATIO >300 MG/G: ICD-10-CM

## 2023-12-28 DIAGNOSIS — Z71.9 HEALTH EDUCATION/COUNSELING: ICD-10-CM

## 2023-12-28 PROCEDURE — G8484 FLU IMMUNIZE NO ADMIN: HCPCS | Performed by: FAMILY MEDICINE

## 2023-12-28 PROCEDURE — 3017F COLORECTAL CA SCREEN DOC REV: CPT | Performed by: FAMILY MEDICINE

## 2023-12-28 PROCEDURE — G8427 DOCREV CUR MEDS BY ELIG CLIN: HCPCS | Performed by: FAMILY MEDICINE

## 2023-12-28 PROCEDURE — 3079F DIAST BP 80-89 MM HG: CPT | Performed by: FAMILY MEDICINE

## 2023-12-28 PROCEDURE — G8419 CALC BMI OUT NRM PARAM NOF/U: HCPCS | Performed by: FAMILY MEDICINE

## 2023-12-28 PROCEDURE — 1090F PRES/ABSN URINE INCON ASSESS: CPT | Performed by: FAMILY MEDICINE

## 2023-12-28 PROCEDURE — 99214 OFFICE O/P EST MOD 30 MIN: CPT | Performed by: FAMILY MEDICINE

## 2023-12-28 PROCEDURE — 1036F TOBACCO NON-USER: CPT | Performed by: FAMILY MEDICINE

## 2023-12-28 PROCEDURE — G8400 PT W/DXA NO RESULTS DOC: HCPCS | Performed by: FAMILY MEDICINE

## 2023-12-28 PROCEDURE — 3077F SYST BP >= 140 MM HG: CPT | Performed by: FAMILY MEDICINE

## 2023-12-28 PROCEDURE — 1123F ACP DISCUSS/DSCN MKR DOCD: CPT | Performed by: FAMILY MEDICINE

## 2023-12-28 RX ORDER — ATORVASTATIN CALCIUM 20 MG/1
20 TABLET, FILM COATED ORAL NIGHTLY
COMMUNITY
End: 2023-12-28

## 2023-12-28 RX ORDER — AMLODIPINE BESYLATE 5 MG/1
5 TABLET ORAL DAILY
COMMUNITY
Start: 2023-08-09 | End: 2023-12-28

## 2024-02-16 ENCOUNTER — OFFICE VISIT (OUTPATIENT)
Facility: CLINIC | Age: 74
End: 2024-02-16

## 2024-02-16 DIAGNOSIS — I10 HYPERTENSION, UNSPECIFIED TYPE: ICD-10-CM

## 2024-02-16 DIAGNOSIS — Z79.4 TYPE 2 DIABETES MELLITUS WITHOUT COMPLICATION, WITH LONG-TERM CURRENT USE OF INSULIN (HCC): ICD-10-CM

## 2024-02-16 DIAGNOSIS — E11.9 TYPE 2 DIABETES MELLITUS WITHOUT COMPLICATION, WITH LONG-TERM CURRENT USE OF INSULIN (HCC): ICD-10-CM

## 2024-02-16 DIAGNOSIS — R05.1 ACUTE COUGH: Primary | ICD-10-CM

## 2024-02-16 DIAGNOSIS — F32.A DEPRESSION, UNSPECIFIED DEPRESSION TYPE: ICD-10-CM

## 2024-02-16 PROBLEM — F02.C0: Status: ACTIVE | Noted: 2024-02-16

## 2024-02-16 PROBLEM — G30.9: Status: ACTIVE | Noted: 2024-02-16

## 2024-07-18 ENCOUNTER — OFFICE VISIT (OUTPATIENT)
Dept: FAMILY MEDICINE CLINIC | Age: 74
End: 2024-07-18
Payer: MEDICARE

## 2024-07-18 VITALS
HEART RATE: 80 BPM | SYSTOLIC BLOOD PRESSURE: 110 MMHG | BODY MASS INDEX: 26.43 KG/M2 | DIASTOLIC BLOOD PRESSURE: 60 MMHG | WEIGHT: 140 LBS | TEMPERATURE: 97 F | OXYGEN SATURATION: 98 % | HEIGHT: 61 IN

## 2024-07-18 DIAGNOSIS — F02.C0 SEVERE ALZHEIMER'S DEMENTIA, UNSPECIFIED TIMING OF DEMENTIA ONSET, UNSPECIFIED WHETHER BEHAVIORAL, PSYCHOTIC, OR MOOD DISTURBANCE OR ANXIETY (HCC): ICD-10-CM

## 2024-07-18 DIAGNOSIS — G30.9 SEVERE ALZHEIMER'S DEMENTIA, UNSPECIFIED TIMING OF DEMENTIA ONSET, UNSPECIFIED WHETHER BEHAVIORAL, PSYCHOTIC, OR MOOD DISTURBANCE OR ANXIETY (HCC): ICD-10-CM

## 2024-07-18 DIAGNOSIS — N18.2 STAGE 2 CHRONIC KIDNEY DISEASE DUE TO BENIGN HYPERTENSION: ICD-10-CM

## 2024-07-18 DIAGNOSIS — I12.9 STAGE 2 CHRONIC KIDNEY DISEASE DUE TO BENIGN HYPERTENSION: ICD-10-CM

## 2024-07-18 DIAGNOSIS — Z00.00 INITIAL MEDICARE ANNUAL WELLNESS VISIT: Primary | ICD-10-CM

## 2024-07-18 PROCEDURE — 3074F SYST BP LT 130 MM HG: CPT | Performed by: FAMILY MEDICINE

## 2024-07-18 PROCEDURE — G0438 PPPS, INITIAL VISIT: HCPCS | Performed by: FAMILY MEDICINE

## 2024-07-18 PROCEDURE — G9901 SNP/LG TRM CRE PT W/POS CDE: HCPCS | Performed by: FAMILY MEDICINE

## 2024-07-18 PROCEDURE — 1123F ACP DISCUSS/DSCN MKR DOCD: CPT | Performed by: FAMILY MEDICINE

## 2024-07-18 PROCEDURE — 3078F DIAST BP <80 MM HG: CPT | Performed by: FAMILY MEDICINE

## 2024-07-18 SDOH — ECONOMIC STABILITY: FOOD INSECURITY: WITHIN THE PAST 12 MONTHS, THE FOOD YOU BOUGHT JUST DIDN'T LAST AND YOU DIDN'T HAVE MONEY TO GET MORE.: NEVER TRUE

## 2024-07-18 SDOH — ECONOMIC STABILITY: FOOD INSECURITY: WITHIN THE PAST 12 MONTHS, YOU WORRIED THAT YOUR FOOD WOULD RUN OUT BEFORE YOU GOT MONEY TO BUY MORE.: NEVER TRUE

## 2024-07-18 SDOH — ECONOMIC STABILITY: INCOME INSECURITY: HOW HARD IS IT FOR YOU TO PAY FOR THE VERY BASICS LIKE FOOD, HOUSING, MEDICAL CARE, AND HEATING?: NOT HARD AT ALL

## 2024-07-18 ASSESSMENT — PATIENT HEALTH QUESTIONNAIRE - PHQ9
SUM OF ALL RESPONSES TO PHQ QUESTIONS 1-9: 0
7. TROUBLE CONCENTRATING ON THINGS, SUCH AS READING THE NEWSPAPER OR WATCHING TELEVISION: NOT AT ALL
9. THOUGHTS THAT YOU WOULD BE BETTER OFF DEAD, OR OF HURTING YOURSELF: NOT AT ALL
6. FEELING BAD ABOUT YOURSELF - OR THAT YOU ARE A FAILURE OR HAVE LET YOURSELF OR YOUR FAMILY DOWN: NOT AT ALL
SUM OF ALL RESPONSES TO PHQ9 QUESTIONS 1 & 2: 0
2. FEELING DOWN, DEPRESSED OR HOPELESS: NOT AT ALL
3. TROUBLE FALLING OR STAYING ASLEEP: NOT AT ALL
5. POOR APPETITE OR OVEREATING: NOT AT ALL
4. FEELING TIRED OR HAVING LITTLE ENERGY: NOT AT ALL
SUM OF ALL RESPONSES TO PHQ QUESTIONS 1-9: 0
1. LITTLE INTEREST OR PLEASURE IN DOING THINGS: NOT AT ALL
SUM OF ALL RESPONSES TO PHQ QUESTIONS 1-9: 0
8. MOVING OR SPEAKING SO SLOWLY THAT OTHER PEOPLE COULD HAVE NOTICED. OR THE OPPOSITE, BEING SO FIGETY OR RESTLESS THAT YOU HAVE BEEN MOVING AROUND A LOT MORE THAN USUAL: NOT AT ALL
SUM OF ALL RESPONSES TO PHQ QUESTIONS 1-9: 0

## 2024-07-18 ASSESSMENT — LIFESTYLE VARIABLES
HOW MANY STANDARD DRINKS CONTAINING ALCOHOL DO YOU HAVE ON A TYPICAL DAY: 1 OR 2
HOW OFTEN DO YOU HAVE A DRINK CONTAINING ALCOHOL: MONTHLY OR LESS

## 2024-07-18 NOTE — PATIENT INSTRUCTIONS
exercise. For many people, walking is a good choice. Or you may want to swim, bike, or do other activities. Bit by bit, increase the time you're active every day. Try for at least 30 minutes on most days of the week.     Try to quit or cut back on using tobacco and other nicotine products. This includes smoking and vaping. If you need help quitting, talk to your doctor about stop-smoking programs and medicines. These can increase your chances of quitting for good. Quitting is one of the most important things you can do to protect your heart. It is never too late to quit. Try to avoid secondhand smoke too.     Stay at a weight that's healthy for you. Talk to your doctor if you need help losing weight.     Try to get 7 to 9 hours of sleep each night.     Limit alcohol to 2 drinks a day for men and 1 drink a day for women. Too much alcohol can cause health problems.     Manage other health problems such as diabetes, high blood pressure, and high cholesterol. If you think you may have a problem with alcohol or drug use, talk to your doctor.   Medicines    Take your medicines exactly as prescribed. Call your doctor if you think you are having a problem with your medicine.     If your doctor recommends aspirin, take the amount directed each day. Make sure you take aspirin and not another kind of pain reliever, such as acetaminophen (Tylenol).   When should you call for help?   Call 911 if you have symptoms of a heart attack. These may include:    Chest pain or pressure, or a strange feeling in the chest.     Sweating.     Shortness of breath.     Pain, pressure, or a strange feeling in the back, neck, jaw, or upper belly or in one or both shoulders or arms.     Lightheadedness or sudden weakness.     A fast or irregular heartbeat.   After you call 911, the  may tell you to chew 1 adult-strength or 2 to 4 low-dose aspirin. Wait for an ambulance. Do not try to drive yourself.  Watch closely for changes in your

## 2024-07-18 NOTE — PROGRESS NOTES
and recollection of the encounter with the patient using one of my own customized note templates utilizing a combination of typing and dictating with the [employer] speech recognition software.  As a result, the note may possibly contain various errors (e.g. spelling, grammar, and non-sensible words/phrases/statements) despite reviewing the note prior to signing it for completion.      Time spent includes some or all of the following, both face-to-face time and non face-to-face time, but is not limited to:  [x] Preparing to see the patient by reviewing medical records available (notes, labs, imaging, etc.) prior to seeing the patient.  [x] Obtaining and/or reviewing the history from the patient.  [x] Performing a medically appropriate examination.  [x] Ordering of relevant lab work, medications, referrals, or procedures.  [x] Discussing patient's medical issues and formulating an assessment and plan.   [x] Reviewing plan of care with patient.  Answering any questions or concerns.   [x] Documentation within the electronic health record (EHR)  [] Reviewing records of history relevant to patient's issues after seeing the patient.  [] Discussion or coordination of care with other health care professionals  [] An  was used during today's visit.  Care and attention was made to make a conscious effort to speak in short, comprehensible phrases.  I had to (at times) repeat or rephrase what I had said to the patient and allowed ample time for both patient and  to speak without interruption.    [] Other:   -I spent a significant amount of time discussing various issues as noted above and also with formulating a treatment plan for each specific issue. Patient was given the opportunity to ask me any questions and address any concerns/issues. I also reviewed lab work (if available) as well as prior notes from PCP and/or other specialists if available.     Shawn Ann

## 2024-11-24 NOTE — PROGRESS NOTES
addressed at today's visit.    -Lastly, the coverage status of a medication may vary from insurance to insurance and the only way to verify if the medication is covered is to send an actual prescription in.    -The drug formulary of each insurance changes without any warning or notification to the healthcare provider let alone the pharmacy.  -The cost of medications vary from insurance to insurance and the cost is always subject to change just like the drug formulary.    Follow-up: Return in about 6 months (around 6/23/2025) for AWV..     Patient was informed that if his or her symptoms worsen to follow up with me sooner or go to the nearest ER if the symptoms are very significant and warrant higher level of care.    Regarding my note:  -This note was composed (by me only and not with assistance via a scribe) to the best of my knowledge and recollection of the encounter with the patient using one of my own customized note templates utilizing a combination of typing and dictating with the [employer] speech recognition software.  As a result, the note may possibly contain various errors (e.g. spelling, grammar, and non-sensible words/phrases/statements) despite reviewing the note prior to signing it for completion.      Time spent includes some or all of the following, both face-to-face time and non face-to-face time, but is not limited to:  [x] Preparing to see the patient by reviewing medical records available (notes, labs, imaging, etc.) prior to seeing the patient.  [x] Obtaining and/or reviewing the history from the patient during the visit.  [x] Performing a medically appropriate examination.  [x] Ordering of relevant lab work, medications, referrals, or procedures when indicated.  [x] Discussing patient's medical issues and formulating an assessment and plan.   [x] Reviewing plan of care with patient.  Answering any questions or concerns.   [x] Documentation of the visit within the

## 2024-12-18 ENCOUNTER — OFFICE VISIT (OUTPATIENT)
Dept: FAMILY MEDICINE CLINIC | Age: 74
End: 2024-12-18
Payer: MEDICARE

## 2024-12-18 VITALS
WEIGHT: 143 LBS | DIASTOLIC BLOOD PRESSURE: 64 MMHG | HEART RATE: 102 BPM | OXYGEN SATURATION: 96 % | BODY MASS INDEX: 27.02 KG/M2 | TEMPERATURE: 97.2 F | SYSTOLIC BLOOD PRESSURE: 118 MMHG

## 2024-12-18 DIAGNOSIS — N18.2 STAGE 2 CHRONIC KIDNEY DISEASE DUE TO BENIGN HYPERTENSION: ICD-10-CM

## 2024-12-18 DIAGNOSIS — F02.C0 SEVERE ALZHEIMER'S DEMENTIA, UNSPECIFIED TIMING OF DEMENTIA ONSET, UNSPECIFIED WHETHER BEHAVIORAL, PSYCHOTIC, OR MOOD DISTURBANCE OR ANXIETY (HCC): Primary | ICD-10-CM

## 2024-12-18 DIAGNOSIS — G30.9 SEVERE ALZHEIMER'S DEMENTIA, UNSPECIFIED TIMING OF DEMENTIA ONSET, UNSPECIFIED WHETHER BEHAVIORAL, PSYCHOTIC, OR MOOD DISTURBANCE OR ANXIETY (HCC): Primary | ICD-10-CM

## 2024-12-18 DIAGNOSIS — I12.9 STAGE 2 CHRONIC KIDNEY DISEASE DUE TO BENIGN HYPERTENSION: ICD-10-CM

## 2024-12-18 PROCEDURE — G9901 SNP/LG TRM CRE PT W/POS CDE: HCPCS | Performed by: FAMILY MEDICINE

## 2024-12-18 PROCEDURE — G8484 FLU IMMUNIZE NO ADMIN: HCPCS | Performed by: FAMILY MEDICINE

## 2024-12-18 PROCEDURE — 1123F ACP DISCUSS/DSCN MKR DOCD: CPT | Performed by: FAMILY MEDICINE

## 2024-12-18 PROCEDURE — G8419 CALC BMI OUT NRM PARAM NOF/U: HCPCS | Performed by: FAMILY MEDICINE

## 2024-12-18 PROCEDURE — 1090F PRES/ABSN URINE INCON ASSESS: CPT | Performed by: FAMILY MEDICINE

## 2024-12-18 PROCEDURE — 99213 OFFICE O/P EST LOW 20 MIN: CPT | Performed by: FAMILY MEDICINE

## 2024-12-18 PROCEDURE — 1159F MED LIST DOCD IN RCRD: CPT | Performed by: FAMILY MEDICINE

## 2024-12-18 PROCEDURE — 3078F DIAST BP <80 MM HG: CPT | Performed by: FAMILY MEDICINE

## 2024-12-18 PROCEDURE — 3074F SYST BP LT 130 MM HG: CPT | Performed by: FAMILY MEDICINE

## 2024-12-18 PROCEDURE — G8427 DOCREV CUR MEDS BY ELIG CLIN: HCPCS | Performed by: FAMILY MEDICINE

## 2024-12-18 PROCEDURE — G8400 PT W/DXA NO RESULTS DOC: HCPCS | Performed by: FAMILY MEDICINE

## 2024-12-18 PROCEDURE — 1036F TOBACCO NON-USER: CPT | Performed by: FAMILY MEDICINE

## 2024-12-18 RX ORDER — LISINOPRIL 10 MG/1
10 TABLET ORAL DAILY
COMMUNITY

## 2024-12-18 RX ORDER — LORAZEPAM 0.5 MG/1
0.5 TABLET ORAL EVERY 6 HOURS PRN
COMMUNITY

## 2024-12-18 ASSESSMENT — ENCOUNTER SYMPTOMS
CONSTIPATION: 0
DIARRHEA: 0
SHORTNESS OF BREATH: 0
CHEST TIGHTNESS: 0
BLOOD IN STOOL: 0
RHINORRHEA: 0
SINUS PRESSURE: 0
TROUBLE SWALLOWING: 0
COUGH: 0
WHEEZING: 0
BACK PAIN: 0
ABDOMINAL DISTENTION: 0
VOMITING: 0
ABDOMINAL PAIN: 0
NAUSEA: 0